# Patient Record
Sex: FEMALE | Race: BLACK OR AFRICAN AMERICAN | Employment: OTHER | URBAN - METROPOLITAN AREA
[De-identification: names, ages, dates, MRNs, and addresses within clinical notes are randomized per-mention and may not be internally consistent; named-entity substitution may affect disease eponyms.]

---

## 2017-01-27 ENCOUNTER — OFFICE VISIT (OUTPATIENT)
Dept: INTERNAL MEDICINE CLINIC | Age: 77
End: 2017-01-27

## 2017-01-27 VITALS
OXYGEN SATURATION: 100 % | TEMPERATURE: 98.7 F | HEIGHT: 62 IN | DIASTOLIC BLOOD PRESSURE: 73 MMHG | BODY MASS INDEX: 28.52 KG/M2 | WEIGHT: 155 LBS | HEART RATE: 66 BPM | SYSTOLIC BLOOD PRESSURE: 117 MMHG

## 2017-01-27 DIAGNOSIS — M81.0 OSTEOPOROSIS: ICD-10-CM

## 2017-01-27 DIAGNOSIS — I10 ESSENTIAL HYPERTENSION: Primary | ICD-10-CM

## 2017-01-27 DIAGNOSIS — C18.9 MALIGNANT NEOPLASM OF COLON, UNSPECIFIED PART OF COLON (HCC): ICD-10-CM

## 2017-01-27 DIAGNOSIS — E78.00 PURE HYPERCHOLESTEROLEMIA: ICD-10-CM

## 2017-01-27 RX ORDER — GLUCOSAMINE SULFATE 1500 MG
1000 POWDER IN PACKET (EA) ORAL DAILY
Qty: 30 CAP | Refills: 1
Start: 2017-01-27 | End: 2019-06-23

## 2017-01-27 NOTE — PROGRESS NOTES
Nicky Soto is a 67 y.o. female. HPI   F/u HTN HLD osteoporosis  Only c/o left knee OA pain -cortisone shot was not effective  Has some dizziness with standing, no recurrent syncope  Not taoking calcium and vit d but on fosamax  No cp or sob sxs  Did not get repeat colonoscopy last year--Dr Keira Drake for hx colon cancer 2007    Patient Active Problem List    Diagnosis Date Noted    Stenosis of both carotid arteries without infarction 11/18/2015    Encephalopathy acute 11/13/2015    Prediabetes 02/26/2015    Osteoporosis 08/27/2014    Heart murmur 12/27/2012    Abdominal pain, other specified site 03/19/2012    Syncope and collapse 03/19/2012    DDD (degenerative disc disease), lumbar 04/28/2011    Altered mental status 08/05/2010    Hypokalemia 08/05/2010    UTI (lower urinary tract infection) 08/05/2010    Hyperglycemia 08/05/2010    HTN (hypertension) 06/24/2010    Colon cancer ([facility] [address] 06/24/2010    Pure hypercholesterolemia 06/24/2010    Depression 06/24/2010    Mitral regurgitation 06/24/2010     Current Outpatient Prescriptions   Medication Sig Dispense Refill    cholecalciferol (VITAMIN D3) 1,000 unit cap Take 1 Cap by mouth daily. 30 Cap 1    pravastatin (PRAVACHOL) 10 mg tablet TAKE 1 TABLET BY MOUTH NIGHTLY 90 Tab 2    amLODIPine (NORVASC) 10 mg tablet TAKE 1 TABLET EVERY DAY 90 Tab 3    alendronate (FOSAMAX) 70 mg tablet Take 1 Tab by mouth every seven (7) days. 12 Tab 4    triamterene-hydrochlorothiazide (MAXZIDE) 37.5-25 mg per tablet TAKE 1 TABLET EVERY DAY (DISCONTINUE CHLORTHALIDONE AND LASIX AND CLONIDINE) 90 Tab 3    lisinopril (PRINIVIL, ZESTRIL) 10 mg tablet Take 1 Tab by mouth daily. 90 Tab 3    Dqauxsul-Ofag-Gtxjgo-Hyalur Ac 717-255-89-2 mg cap Take 1 Cap by mouth daily.  calcium 600 mg Cap Take 600 mg by mouth daily.        Allergies   Allergen Reactions    Ambien [Zolpidem] Other (comments)     AMS-hospitalized 2010      Lab Results  Component Value Date/Time   Hemoglobin A1c 5.5 03/20/2012 04:30 AM   Glucose 109 09/10/2016 04:15 PM   Glucose (POC) 116 09/19/2012 06:47 AM   Glucose (POC) 110 03/28/2012 11:50 AM   LDL, calculated 89 07/25/2016 08:36 AM   Creatinine (POC) 1.0 09/19/2012 06:47 AM   Creatinine 1.65 09/10/2016 04:15 PM      Lab Results  Component Value Date/Time   Cholesterol, total 168 07/25/2016 08:36 AM   HDL Cholesterol 56 07/25/2016 08:36 AM   LDL, calculated 89 07/25/2016 08:36 AM   Triglyceride 114 07/25/2016 08:36 AM   CHOL/HDL Ratio 3.2 03/20/2012 04:30 AM       Lab Results  Component Value Date/Time   GFR est AA 37 09/10/2016 04:15 PM   GFR est non-AA 30 09/10/2016 04:15 PM   Creatinine (POC) 1.0 09/19/2012 06:47 AM   Creatinine 1.65 09/10/2016 04:15 PM   BUN 20 09/10/2016 04:15 PM   BUN (POC) 18 09/19/2012 06:47 AM   Sodium (POC) 142 09/19/2012 06:47 AM   Sodium 140 09/10/2016 04:15 PM   Potassium 3.1 09/10/2016 04:15 PM   Potassium (POC) 2.7 09/19/2012 06:47 AM   Chloride (POC) 99 09/19/2012 06:47 AM   Chloride 107 09/10/2016 04:15 PM   CO2 24 09/10/2016 04:15 PM         ROS    Physical Exam   Constitutional: She appears well-developed and well-nourished. Appears stated age   Cardiovascular: Normal rate, regular rhythm and normal heart sounds. Exam reveals no gallop and no friction rub. No murmur heard. Pulmonary/Chest: Effort normal and breath sounds normal. No respiratory distress. She has no wheezes. Abdominal: Soft. Bowel sounds are normal.   Musculoskeletal: She exhibits no edema. Neurological: She is alert. Psychiatric: She has a normal mood and affect. Nursing note and vitals reviewed. ASSESSMENT and PLAN  Sadie was seen today for hypertension, cholesterol problem, dizziness and knee pain.     Diagnoses and all orders for this visit:    Essential hypertension  -     METABOLIC PANEL, COMPREHENSIVE   controlled    Pure hypercholesterolemia  -     METABOLIC PANEL, COMPREHENSIVE  - LIPID PANEL   Continue pravastatin    Malignant neoplasm of colon, unspecified part of colon ([facility] [address]  -     REFERRAL TO GASTROENTEROLOGY-due for surveillance colonoscopy-discussed    Osteoporosis   Continue fosamax, vit d , calcium   Repeat dexa next year 2018 for L spine osteoporosis  Other orders  -     cholecalciferol (VITAMIN D3) 1,000 unit cap; Take 1 Cap by mouth daily. Follow-up Disposition:  Return in about 6 months (around 7/27/2017) for htn hld osteoporosis.

## 2017-01-27 NOTE — MR AVS SNAPSHOT
Visit Information Date & Time Provider Department Dept. Phone Encounter #  
 1/27/2017  8:00 AM Scott Scherer, [address] [place] [address],[address] [phone] [id number] Follow-up Instructions Return in about 6 months (around 7/27/2017) for htn hld osteoporosis. Upcoming Health Maintenance Date Due DTaP/Tdap/Td series (1 - Tdap) 5/2/1960 GLAUCOMA SCREENING Q2Y 5/2/2004 MEDICARE YEARLY EXAM 8/25/2016 COLONOSCOPY 9/21/2016 Pneumococcal 65+ High/Highest Risk (2 of 2 - PPSV23) 3/24/2017 Allergies as of 1/27/2017  Review Complete On: 1/27/2017 By: Scott Scherer MD  
  
 Severity Noted Reaction Type Reactions Ambien [Zolpidem]  04/28/2011    Other (comments) AMS-hospitalized 2010 Current Immunizations  Reviewed on 3/20/2012 Name Date Influenza Vaccine Split  Deferred (Patient Refused) Pneumococcal Vaccine (Unspecified Type)  Deferred (Patient Refused) Not reviewed this visit You Were Diagnosed With   
  
 Codes Comments Essential hypertension    -  Primary ICD-10-CM: I10 
ICD-9-CM: 401.9 Pure hypercholesterolemia     ICD-10-CM: E78.00 ICD-9-CM: 272.0 Malignant neoplasm of colon, unspecified part of colon ([facility] [address]     ICD-10-CM: C18.9 ICD-9-CM: 153.9 Vitals BP Pulse Temp Height(growth percentile) Weight(growth percentile) SpO2  
 117/73 (BP 1 Location: Left arm, BP Patient Position: Sitting) 66 98.7 °F (37.1 °C) (Oral) 5' 2\" (1.575 m) 155 lb (70.3 kg) 100% BMI OB Status Smoking Status 28.35 kg/m2 Hysterectomy Never Smoker BMI and BSA Data Body Mass Index Body Surface Area  
 28.35 kg/m 2 1.75 m 2 Preferred Pharmacy Pharmacy Name Phone [address] [phone] Your Updated Medication List  
  
   
This list is accurate as of: 1/27/17  8:45 AM.  Always use your most recent med list.  
  
  
  
  
 alendronate 70 mg tablet Commonly known as:  FOSAMAX Take 1 Tab by mouth every seven (7) days. amLODIPine 10 mg tablet Commonly known as:  Nia Spruce TAKE 1 TABLET EVERY DAY  
  
 calcium 600 mg Cap Take 600 mg by mouth daily. cholecalciferol 1,000 unit Cap Commonly known as:  VITAMIN D3 Take 1 Cap by mouth daily. Nnciohfr-Qzex-Nbbryu-Hyalur Ac 345-386-69-2 mg Cap Take 1 Cap by mouth daily. lisinopril 10 mg tablet Commonly known as:  Marge Fernanda Take 1 Tab by mouth daily. pravastatin 10 mg tablet Commonly known as:  PRAVACHOL  
TAKE 1 TABLET BY MOUTH NIGHTLY  
  
 triamterene-hydroCHLOROthiazide 37.5-25 mg per tablet Commonly known as:  Prabhjot Beady TAKE 1 TABLET EVERY DAY (DISCONTINUE CHLORTHALIDONE AND LASIX AND CLONIDINE) We Performed the Following LIPID PANEL [64492 CPT(R)] METABOLIC PANEL, COMPREHENSIVE [07102 CPT(R)] REFERRAL TO GASTROENTEROLOGY [WVP24 Custom] Comments:  
 Please evaluate patient for colonoscopy Follow-up Instructions Return in about 6 months (around 7/27/2017) for htn hld osteoporosis. Referral Information Referral ID Referred By Referred To  
  
 [id number] [facility], [address]   
   305 [facility] [address] [place], [address] [facility] Visits Status Start Date End Date 1 New Request 1/27/17 1/27/18 If your referral has a status of pending review or denied, additional information will be sent to support the outcome of this decision. Introducing [facility] & HEALTH SERVICES! Dear Norah Listen: Thank you for requesting a Youcruit account. Our records indicate that you already have an active Youcruit account. You can access your account anytime at [email or web address] Did you know that you can access your hospital and ER discharge instructions at any time in Youcruit? You can also review all of your test results from your hospital stay or ER visit. Additional Information If you have questions, please visit the Frequently Asked Questions section of the Algorithmiat website at [email or web address] Remember, [employer] is NOT to be used for urgent needs. For medical emergencies, dial 911. Now available from your iPhone and Android! Please provide this summary of care documentation to your next provider. Your primary care clinician is listed as Avila KELLER. If you have any questions after today's visit, please call [phone].

## 2017-01-28 DIAGNOSIS — R94.4 ABNORMAL RENAL FUNCTION TEST: Primary | ICD-10-CM

## 2017-01-28 LAB
ALBUMIN SERPL-MCNC: 4 G/DL (ref 3.5–4.8)
ALBUMIN/GLOB SERPL: 1.4 {RATIO} (ref 1.1–2.5)
ALP SERPL-CCNC: 48 IU/L (ref 39–117)
ALT SERPL-CCNC: 12 IU/L (ref 0–32)
AST SERPL-CCNC: 18 IU/L (ref 0–40)
BILIRUB SERPL-MCNC: 0.4 MG/DL (ref 0–1.2)
BUN SERPL-MCNC: 25 MG/DL (ref 8–27)
BUN/CREAT SERPL: 19 (ref 11–26)
CALCIUM SERPL-MCNC: 9.4 MG/DL (ref 8.7–10.3)
CHLORIDE SERPL-SCNC: 103 MMOL/L (ref 96–106)
CHOLEST SERPL-MCNC: 160 MG/DL (ref 100–199)
CO2 SERPL-SCNC: 27 MMOL/L (ref 18–29)
CREAT SERPL-MCNC: 1.33 MG/DL (ref 0.57–1)
GLOBULIN SER CALC-MCNC: 2.8 G/DL (ref 1.5–4.5)
GLUCOSE SERPL-MCNC: 101 MG/DL (ref 65–99)
HDLC SERPL-MCNC: 52 MG/DL
LDLC SERPL CALC-MCNC: 86 MG/DL (ref 0–99)
POTASSIUM SERPL-SCNC: 3.8 MMOL/L (ref 3.5–5.2)
PROT SERPL-MCNC: 6.8 G/DL (ref 6–8.5)
SODIUM SERPL-SCNC: 145 MMOL/L (ref 134–144)
TRIGL SERPL-MCNC: 108 MG/DL (ref 0–149)
VLDLC SERPL CALC-MCNC: 22 MG/DL (ref 5–40)

## 2017-01-29 NOTE — PROGRESS NOTES
Tell pt her kidney function is mildly impaired possible from age and htn but also some dehydration and med side effect. Stop lisinopril for now, hydrate amd get repeat bmp next week-will order lab. Cholesterol levels are well controlled.

## 2017-02-02 ENCOUNTER — APPOINTMENT (OUTPATIENT)
Dept: INTERNAL MEDICINE CLINIC | Age: 77
End: 2017-02-02

## 2017-02-03 ENCOUNTER — DOCUMENTATION ONLY (OUTPATIENT)
Dept: INTERNAL MEDICINE CLINIC | Age: 77
End: 2017-02-03

## 2017-02-03 LAB
BUN SERPL-MCNC: 19 MG/DL (ref 8–27)
BUN/CREAT SERPL: 15 (ref 11–26)
CALCIUM SERPL-MCNC: 9 MG/DL (ref 8.7–10.3)
CHLORIDE SERPL-SCNC: 100 MMOL/L (ref 96–106)
CO2 SERPL-SCNC: 23 MMOL/L (ref 18–29)
CREAT SERPL-MCNC: 1.27 MG/DL (ref 0.57–1)
GLUCOSE SERPL-MCNC: 109 MG/DL (ref 65–99)
POTASSIUM SERPL-SCNC: 3.3 MMOL/L (ref 3.5–5.2)
SODIUM SERPL-SCNC: 140 MMOL/L (ref 134–144)

## 2017-02-03 RX ORDER — POTASSIUM CHLORIDE 750 MG/1
10 TABLET, EXTENDED RELEASE ORAL 2 TIMES DAILY
Qty: 10 TAB | Refills: 0 | Status: SHIPPED | OUTPATIENT
Start: 2017-02-03 | End: 2017-02-08

## 2017-02-03 NOTE — PROGRESS NOTES
Discussed labs. Creatinine improved but not back to baseline. Stay off lisinopril. Monitor bp and return to office if > 140/90  escribed kcl x 5d,. Stop fosamax d/t elevated creatinin  F/u as scheduled , sooner if bp is elevated.

## 2017-02-08 RX ORDER — AMLODIPINE BESYLATE 10 MG/1
10 TABLET ORAL DAILY
COMMUNITY
End: 2017-06-02

## 2017-02-10 ENCOUNTER — SURGERY (OUTPATIENT)
Age: 77
End: 2017-02-10

## 2017-02-10 ENCOUNTER — ANESTHESIA EVENT (OUTPATIENT)
Dept: ENDOSCOPY | Age: 77
End: 2017-02-10
Payer: MEDICARE

## 2017-02-10 ENCOUNTER — ANESTHESIA (OUTPATIENT)
Dept: ENDOSCOPY | Age: 77
End: 2017-02-10
Payer: MEDICARE

## 2017-02-10 ENCOUNTER — HOSPITAL ENCOUNTER (OUTPATIENT)
Age: 77
Setting detail: OUTPATIENT SURGERY
Discharge: HOME | End: 2017-02-10
Attending: INTERNAL MEDICINE | Admitting: INTERNAL MEDICINE
Payer: MEDICARE

## 2017-02-10 VITALS
BODY MASS INDEX: 27.99 KG/M2 | HEART RATE: 52 BPM | OXYGEN SATURATION: 100 % | DIASTOLIC BLOOD PRESSURE: 71 MMHG | RESPIRATION RATE: 15 BRPM | HEIGHT: 62 IN | WEIGHT: 152.13 LBS | SYSTOLIC BLOOD PRESSURE: 139 MMHG | TEMPERATURE: 97.8 F

## 2017-02-10 PROCEDURE — 74011250636 HC RX REV CODE- 250/636: Performed by: INTERNAL MEDICINE

## 2017-02-10 PROCEDURE — 74011250636 HC RX REV CODE- 250/636

## 2017-02-10 PROCEDURE — 77030009426 HC FCPS BIOP ENDOSC BSC -B: Performed by: INTERNAL MEDICINE

## 2017-02-10 PROCEDURE — 88305 TISSUE EXAM BY PATHOLOGIST: CPT | Performed by: INTERNAL MEDICINE

## 2017-02-10 PROCEDURE — 74011250637 HC RX REV CODE- 250/637: Performed by: INTERNAL MEDICINE

## 2017-02-10 PROCEDURE — 76040000019: Performed by: INTERNAL MEDICINE

## 2017-02-10 PROCEDURE — 76060000031 HC ANESTHESIA FIRST 0.5 HR: Performed by: INTERNAL MEDICINE

## 2017-02-10 RX ORDER — SODIUM CHLORIDE 9 MG/ML
50 INJECTION, SOLUTION INTRAVENOUS CONTINUOUS
Status: DISCONTINUED | OUTPATIENT
Start: 2017-02-10 | End: 2017-02-10 | Stop reason: HOSPADM

## 2017-02-10 RX ORDER — SODIUM CHLORIDE 0.9 % (FLUSH) 0.9 %
5-10 SYRINGE (ML) INJECTION EVERY 8 HOURS
Status: DISCONTINUED | OUTPATIENT
Start: 2017-02-10 | End: 2017-02-10 | Stop reason: HOSPADM

## 2017-02-10 RX ORDER — PROPOFOL 10 MG/ML
INJECTION, EMULSION INTRAVENOUS AS NEEDED
Status: DISCONTINUED | OUTPATIENT
Start: 2017-02-10 | End: 2017-02-10 | Stop reason: HOSPADM

## 2017-02-10 RX ORDER — MIDAZOLAM HYDROCHLORIDE 1 MG/ML
1-2 INJECTION, SOLUTION INTRAMUSCULAR; INTRAVENOUS
Status: CANCELLED | OUTPATIENT
Start: 2017-02-10 | End: 2017-02-10

## 2017-02-10 RX ORDER — SODIUM CHLORIDE 0.9 % (FLUSH) 0.9 %
5-10 SYRINGE (ML) INJECTION AS NEEDED
Status: DISCONTINUED | OUTPATIENT
Start: 2017-02-10 | End: 2017-02-10 | Stop reason: HOSPADM

## 2017-02-10 RX ORDER — DEXTROMETHORPHAN/PSEUDOEPHED 2.5-7.5/.8
1.2 DROPS ORAL
Status: DISCONTINUED | OUTPATIENT
Start: 2017-02-10 | End: 2017-02-10 | Stop reason: HOSPADM

## 2017-02-10 RX ADMIN — SODIUM CHLORIDE 50 ML/HR: 900 INJECTION, SOLUTION INTRAVENOUS at 07:24

## 2017-02-10 RX ADMIN — PROPOFOL 110 MG: 10 INJECTION, EMULSION INTRAVENOUS at 07:55

## 2017-02-10 RX ADMIN — SIMETHICONE 80 MG: 20 SUSPENSION/ DROPS ORAL at 07:49

## 2017-02-10 NOTE — ANESTHESIA POSTPROCEDURE EVALUATION
Post-Anesthesia Evaluation and Assessment    Patient: Edi Cole MRN: [id number]  SSN: [id number]    YOB: 1940  Age: 67 y.o. Sex: female       Cardiovascular Function/Vital Signs  Visit Vitals    /66    Pulse (!) 55    Temp 36.6 °C (97.8 °F)    Resp 22    Ht 5' 2\" (1.575 m)    Wt 69 kg (152 lb 2 oz)    SpO2 99%    Breastfeeding No    BMI 27.82 kg/m2       Patient is status post general anesthesia for Procedure(s):  COLONOSCOPY  ENDOSCOPIC POLYPECTOMY. Nausea/Vomiting: None    Postoperative hydration reviewed and adequate. Pain:  Pain Scale 1: Numeric (0 - 10) (02/10/17 0809)  Pain Intensity 1: 0 (02/10/17 0809)   Managed    Neurological Status: At baseline    Mental Status and Level of Consciousness: Arousable    Pulmonary Status:   O2 Device: Room air (02/10/17 0809)   Adequate oxygenation and airway patent    Complications related to anesthesia: None    Post-anesthesia assessment completed.  No concerns    Signed By: Xi Morfin MD     February 10, 2017

## 2017-02-10 NOTE — DISCHARGE INSTRUCTIONS
Edi Cole  [id number]  1940    COLON DISCHARGE INSTRUCTIONS  Discomfort:  Redness at IV site- apply warm compress to area; if redness or soreness persist- contact your physician  There may be a slight amount of blood passed from the rectum  Gaseous discomfort- walking, belching will help relieve any discomfort  You may not operate a vehicle for 12 hours  You may not engage in an occupation involving machinery or appliances for rest of today  You may not drink alcoholic beverages for at least 12 hours  Avoid making any critical decisions for at least 24 hour  DIET:   Regular diet    - however -  remember your colon is empty and a heavy meal will produce gas. ACTIVITY:  It is recommended that you spend the remainder of the day resting -  avoid any strenuous activity. CALL M.D. ANY SIGN OF:   Increasing pain, nausea, vomiting  Abdominal distension (swelling)  New increased bleeding (oral or rectal)  Fever (chills)    Follow-up Instructions:   Call Dr. Lambert Smaller results in 10 days  Telephone # [phone]  Brief Findings: one polyp removed        DISCHARGE SUMMARY from Nurse    The following personal items collected during your admission are returned to you:   Dental Appliance: Dental Appliances: Lowers, Uppers, With patient  Vision: Visual Aid: Glasses, With patient  Hearing Aid:    Jewelry:    Clothing:    Other Valuables:    Valuables sent to safe: [employer] Activation    Thank you for requesting access to Oneexchangestreet. Please follow the instructions below to securely access and download your online medical record. Oneexchangestreet allows you to send messages to your doctor, view your test results, renew your prescriptions, schedule appointments, and more. How Do I Sign Up? 1. In your internet browser, go to www."[email or web address]"  2. Click on the First Time User? Click Here link in the Sign In box. You will be redirect to the New Member Sign Up page.   3. Enter your Oneexchangestreet Access Code exactly as it appears below. You will not need to use this code after youve completed the sign-up process. If you do not sign up before the expiration date, you must request a new code. StartersFund Access Code: Activation code not generated  Current StartersFund Status: Active (This is the date your StartersFund access code will )    4. Enter the last four digits of your Social Security Number (xxxx) and Date of Birth (mm/dd/yyyy) as indicated and click Submit. You will be taken to the next sign-up page. 5. Create a Chronon Systemst ID. This will be your StartersFund login ID and cannot be changed, so think of one that is secure and easy to remember. 6. Create a StartersFund password. You can change your password at any time. 7. Enter your Password Reset Question and Answer. This can be used at a later time if you forget your password. 8. Enter your e-mail address. You will receive e-mail notification when new information is available in [address] 9. Click Sign Up. You can now view and download portions of your medical record. 10. Click the Download Summary menu link to download a portable copy of your medical information. Additional Information    If you have questions, please visit the Frequently Asked Questions section of the StartersFund website at [email or web address] Remember, StartersFund is NOT to be used for urgent needs. For medical emergencies, dial 911.

## 2017-02-10 NOTE — IP AVS SNAPSHOT
[address] [facility] [address]. 
[phone] Patient: Danelle Lipoma MRN: [id number] MUZ:[birth year] You are allergic to the following Allergen Reactions Ambien (Zolpidem) Other (comments) AMS-hospitalized 2010 Recent Documentation Height Weight Breastfeeding? BMI OB Status Smoking Status 1.575 m 69 kg No 27.82 kg/m2 Hysterectomy Never Smoker Emergency Contacts Name Discharge Info Relation Home Work Mobile Cesar Fischer CAREGIVER [3] Son [22] [phone] Alberto Aviles  Other Relative [6] [phone] About your hospitalization You were admitted on:  February 10, 2017 You last received care in the:  [facility] ENDOSCOPY You were discharged on:  February 10, 2017 Unit phone number:  [phone] Why you were hospitalized Your primary diagnosis was:  Not on File Providers Seen During Your Hospitalizations Provider Role Specialty Primary office phone Ellen Narayanan MD Attending Provider Gastroenterology [phone] Your Primary Care Physician (PCP) Primary Care Physician Office Phone Office Fax Rod Hook [phone] Follow-up Information None Current Discharge Medication List  
  
CONTINUE these medications which have NOT CHANGED Dose & Instructions Dispensing Information Comments Morning Noon Evening Bedtime  
 amLODIPine 10 mg tablet Commonly known as:  Tad Alfredo Your next dose is: Today, Tomorrow Other:  _________ Dose:  10 mg Take 10 mg by mouth daily. Refills:  0  
     
   
   
   
  
 calcium 600 mg Cap Your next dose is: Today, Tomorrow Other:  _________ Dose:  600 mg Take 600 mg by mouth daily. Refills:  0  
     
   
   
   
  
 cholecalciferol 1,000 unit Cap Commonly known as:  VITAMIN D3  
   
 Your next dose is: Today, Tomorrow Other:  _________ Dose:  1000 Units Take 1 Cap by mouth daily. Quantity:  30 Cap Refills:  1 Cohcugzn-Miws-Yzvmcg-Hyalur Ac 492-095-67-2 mg Cap Your next dose is: Today, Tomorrow Other:  _________ Dose:  1 Cap Take 1 Cap by mouth daily. Refills:  0  
     
   
   
   
  
 triamterene-hydroCHLOROthiazide 37.5-25 mg per tablet Commonly known as:  Mat Slice Your next dose is: Today, Tomorrow Other:  _________ TAKE 1 TABLET EVERY DAY (DISCONTINUE CHLORTHALIDONE AND LASIX AND CLONIDINE) Quantity:  90 Tab Refills:  3 Discharge Instructions Sadie Alfonso [id number] 
1940 COLON DISCHARGE INSTRUCTIONS Discomfort: 
Redness at IV site- apply warm compress to area; if redness or soreness persist- contact your physician There may be a slight amount of blood passed from the rectum Gaseous discomfort- walking, belching will help relieve any discomfort You may not operate a vehicle for 12 hours You may not engage in an occupation involving machinery or appliances for rest of today You may not drink alcoholic beverages for at least 12 hours Avoid making any critical decisions for at least 24 hour DIET: 
 Regular diet  however -  remember your colon is empty and a heavy meal will produce gas. ACTIVITY: It is recommended that you spend the remainder of the day resting -  avoid any strenuous activity. CALL M.D. ANY SIGN OF: Increasing pain, nausea, vomiting Abdominal distension (swelling) New increased bleeding (oral or rectal) Fever (chills) Follow-up Instructions: 
 Call Dr. Annalise Antoine Biopsy results in 10 days Telephone # [phone] Brief Findings: one polyp removed DISCHARGE SUMMARY from Nurse The following personal items collected during your admission are returned to you: Dental Appliance: Dental Appliances: Lowers, Uppers, With patient Vision: Visual Aid: Glasses, With patient Hearing Aid:   
Jewelry:   
Clothing:   
Other Valuables:   
Valuables sent to safe: [employer] Activation Thank you for requesting access to 5k Fans. Please follow the instructions below to securely access and download your online medical record. 5k Fans allows you to send messages to your doctor, view your test results, renew your prescriptions, schedule appointments, and more. How Do I Sign Up? 1. In your internet browser, go to [email or web address] 
2. Click on the First Time User? Click Here link in the Sign In box. You will be redirect to the New Member Sign Up page. 3. Enter your 5k Fans Access Code exactly as it appears below. You will not need to use this code after youve completed the sign-up process. If you do not sign up before the expiration date, you must request a new code. 5k Fans Access Code: Activation code not generated Current 5k Fans Status: Active (This is the date your 5k Fans access code will ) 4. Enter the last four digits of your Social Security Number (xxxx) and Date of Birth (mm/dd/yyyy) as indicated and click Submit. You will be taken to the next sign-up page. 5. Create a 5k Fans ID. This will be your 5k Fans login ID and cannot be changed, so think of one that is secure and easy to remember. 6. Create a 5k Fans password. You can change your password at any time. 7. Enter your Password Reset Question and Answer. This can be used at a later time if you forget your password. 8. Enter your e-mail address. You will receive e-mail notification when new information is available in [address] 9. Click Sign Up. You can now view and download portions of your medical record. 10. Click the Download Summary menu link to download a portable copy of your medical information. Additional Information If you have questions, please visit the Frequently Asked Questions section of the Attentio website at [email or web address] [email or web address] Remember, [email or web address] is NOT to be used for urgent needs. For medical emergencies, dial 911. Discharge Orders None [facility] SERVICES! Dear Michelle Rueda: Thank you for requesting a Attentio account. Our records indicate that you already have an active Attentio account. You can access your account anytime at [email or web address]. [email or web address]/[email or web address] Desk Did you know that you can access your hospital and ER discharge instructions at any time in Attentio? You can also review all of your test results from your hospital stay or ER visit. Additional Information If you have questions, please visit the Frequently Asked Questions section of the Attentio website at [email or web address] Remember, [email or web address] is NOT to be used for urgent needs. For medical emergencies, dial 911. Now available from your iPhone and Android! General Information Please provide this summary of care documentation to your next provider. Patient Signature:  ____________________________________________________________ Date:  ____________________________________________________________  
  
[employer] Provider Signature:  ____________________________________________________________ Date:  ____________________________________________________________

## 2017-02-10 NOTE — H&P
Gastroenterology History and Physical    Patient: [facility]    Physician: Karina Short MD    Vital Signs: Blood pressure 138/74, pulse 60, temperature 97.9 °F (36.6 °C), resp. rate 12, height 5' 2\" (1.575 m), weight 69 kg (152 lb 2 oz), SpO2 100 %, not currently breastfeeding. Allergies: Allergies   Allergen Reactions    Ambien [Zolpidem] Other (comments)     AMS-hospitalized 2010       Indication: hx colon cancer    History:  Past Medical History   Diagnosis Date    Cancer ([address]      colon    Hypertension     Osteoporosis       Past Surgical History   Procedure Laterality Date    Hx hysterectomy      Hx colectomy  2007    Pr colonoscopy flx dx w/collj spec when pfrmd  9/21/2011          Hx tonsillectomy      Hx hernia repair  9/19/12     incisional      Social History     Social History    Marital status: SINGLE     Spouse name: N/A    Number of children: N/A    Years of education: N/A     Social History Main Topics    Smoking status: Never Smoker    Smokeless tobacco: Never Used    Alcohol use No    Drug use: No    Sexual activity: No     Other Topics Concern    None     Social History Narrative      Family History   Problem Relation Age of Onset    Cancer Mother      Pancreas    Liver Disease Father     Hypertension Sister        Medications:   Prior to Admission medications    Medication Sig Start Date End Date Taking? Authorizing Provider   amLODIPine (NORVASC) 10 mg tablet Take 10 mg by mouth daily. Yes Historical Provider   cholecalciferol (VITAMIN D3) 1,000 unit cap Take 1 Cap by mouth daily. 1/27/17  Yes Amanda Smith MD   triamterene-hydrochlorothiazide (MAXZIDE) 37.5-25 mg per tablet TAKE 1 TABLET EVERY DAY (DISCONTINUE CHLORTHALIDONE AND LASIX AND CLONIDINE) 6/22/16  Yes Amanda Smith MD   Ijkcpacl-Yceg-Sddijk-Hyalur Ac 887-526-00-2 mg cap Take 1 Cap by mouth daily. Yes Historical Provider   calcium 600 mg Cap Take 600 mg by mouth daily.    Yes Historical Provider Physical Exam:   HEENT: Head: Normocephalic, no lesions, without obvious abnormality.    Heart: regular rate and rhythm, S1, S2 normal, no murmur, click, rub or gallop   Lungs: chest clear, no wheezing, rales, normal symmetric air entry, Heart exam - S1, S2 normal, no murmur, no gallop, rate regular   Abdominal: Bowel sounds are normal, liver is not enlarged, spleen is not enlarged     Signed:  Kaci Cannon MD 2/10/2017

## 2017-02-10 NOTE — ANESTHESIA PREPROCEDURE EVALUATION
Anesthetic History   No history of anesthetic complications            Review of Systems / Medical History  Patient summary reviewed, nursing notes reviewed and pertinent labs reviewed    Pulmonary  Within defined limits                 Neuro/Psych         Psychiatric history     Cardiovascular    Hypertension              Exercise tolerance: >4 METS  Comments: 9-2016 EKG: NSR     ECHO: 60-65% EF, very mild AS, mild TR, mod IL    Denies hrt prob or chest pain   GI/Hepatic/Renal               Comments: Hx of colon ca Endo/Other        Arthritis     Other Findings            Physical Exam    Airway  Mallampati: I  TM Distance: 4 - 6 cm  Neck ROM: normal range of motion        Cardiovascular    Rhythm: regular  Rate: normal    Murmur: Grade 3     Dental    Dentition: Full upper dentures and Full lower dentures     Pulmonary  Breath sounds clear to auscultation               Abdominal  GI exam deferred       Other Findings            Anesthetic Plan    ASA: 2  Anesthesia type: total IV anesthesia          Induction: Intravenous  Anesthetic plan and risks discussed with: Patient      Did NOT take BP med this am,   /74

## 2017-02-10 NOTE — PROGRESS NOTES
Silvia [facility]  1940  [id number]    Situation:  Verbal report received from: James Brennan RN  Procedure: Procedure(s) with comments:  COLONOSCOPY  ENDOSCOPIC POLYPECTOMY - polypectomy    Background:    Preoperative diagnosis: HX COLON CANCER  Postoperative diagnosis: Colon polyp, diverticulosis    :  Dr. Sameera Ann  Assistant(s): Endoscopy Technician-1: Angelina Lanes  Endoscopy RN-1: Rhianna Hart RN    Specimens:   ID Type Source Tests Collected by Time Destination   1 : pasth - Sigmoid colon polyp Preservative Sigmoid  Uday Lyons MD 2/10/2017 3652 Pathology     H. Pylori  no    Assessment:  Intra-procedure medications     Anesthesia gave intra-procedure sedation and medications, see anesthesia flow sheet yes    Intravenous fluids: NS@ KVO     Vital signs stable       Abdominal assessment: round and soft       Recommendation:  Discharge patient per MD order  .       Family or Friend  Mao Glatter  Permission to share finding with family or friend yes

## 2017-02-10 NOTE — PROCEDURES
Colonoscopy Operative Report  Julia Chadwick  1940  [id number]      Procedure Type:   Colonoscopy with polypectomy (hot biopsy)     Indications:     Personal history of colon cancer (screening only)    Pre-operative Diagnosis: see indication above    Post-operative Diagnosis:  See findings below    : Nikky Barbosa MD    Referring Provider: Marcus Diggs    Sedation:  MAC anesthesia Propofol    Pre-Procedural Exam:      Airway: clear, Malimpati 2   Heart: RRR, without gallops or rubs  Lungs: clear bilaterally without wheezes, crackles, or rhonchi  Abdomen: soft, nontender, nondistended, bowel sounds present     Procedure Details:  After informed consent was obtained with all risks and benefits of procedure explained and preoperative exam completed, the patient was taken to the endoscopy suite and placed in the left lateral decubitus position. Upon sequential sedation as per above, a digital rectal exam was performed. The Olympus videocolonoscope [id number] was inserted in the rectum and carefully advanced to the terminal ileum. The quality of preparation was good. The colonoscope was slowly withdrawn with careful evaluation between folds. Retoflexion in the rectum was completed. Findings/Impression: Rectum:   Normal  Sigmoid:     - Excavated lesions:     - Diverticulosis    - Protruding lesions:     -Sessile Polyp(s) size 2 mm removed by polypectomy (hot biopsy)  Descending Colon:   Normal  Transverse Colon:   Normal  Ascending Colon:     - Normal ileo-colonic anastamosis  Cecum:     - N/A            Specimen Removed:  Tiny polyp cauterized    Complications: None. EBL:  None. Recommendations: --Repeat colonoscopy in 5 years. Regular diet. Resume normal medication(s). You may be asked to call your doctor's office for the results. Discharge Disposition:  Home in the company of a  when able to ambulate.

## 2017-05-01 ENCOUNTER — OFFICE VISIT (OUTPATIENT)
Dept: INTERNAL MEDICINE CLINIC | Age: 77
End: 2017-05-01

## 2017-05-01 VITALS
TEMPERATURE: 98.5 F | DIASTOLIC BLOOD PRESSURE: 67 MMHG | BODY MASS INDEX: 29.19 KG/M2 | SYSTOLIC BLOOD PRESSURE: 123 MMHG | WEIGHT: 158.6 LBS | OXYGEN SATURATION: 98 % | HEART RATE: 74 BPM | HEIGHT: 62 IN

## 2017-05-01 DIAGNOSIS — R60.0 BILATERAL EDEMA OF LOWER EXTREMITY: Primary | ICD-10-CM

## 2017-05-01 DIAGNOSIS — N18.30 CKD (CHRONIC KIDNEY DISEASE) STAGE 3, GFR 30-59 ML/MIN (HCC): ICD-10-CM

## 2017-05-01 RX ORDER — FUROSEMIDE 20 MG/1
20 TABLET ORAL DAILY
Qty: 30 TAB | Refills: 1 | Status: SHIPPED | OUTPATIENT
Start: 2017-05-01 | End: 2017-05-01 | Stop reason: SDUPTHER

## 2017-05-01 RX ORDER — PRAVASTATIN SODIUM 10 MG/1
TABLET ORAL
COMMUNITY
End: 2017-11-01 | Stop reason: SDUPTHER

## 2017-05-01 RX ORDER — FUROSEMIDE 20 MG/1
TABLET ORAL
Qty: 90 TAB | Refills: 1 | Status: SHIPPED | OUTPATIENT
Start: 2017-05-01 | End: 2017-06-02 | Stop reason: SDUPTHER

## 2017-05-01 NOTE — PROGRESS NOTES
Karena Floyd is a 67 y.o. female. HPI     C/o pedal edema x weeks  Had 2 cortisone shots to left knee which did not help the knee pain. Pt declines TKR  Lisinopril was stopped 3 mos ago d/t elevated bun/cr. No nsaids  Has not been wearing support stockings at home   Denies any cp sob pnd orthopnea      Patient Active Problem List    Diagnosis Date Noted    Stenosis of both carotid arteries without infarction 11/18/2015    Encephalopathy acute 11/13/2015    Prediabetes 02/26/2015    Osteoporosis 08/27/2014    Heart murmur 12/27/2012    Abdominal pain, other specified site 03/19/2012    Syncope and collapse 03/19/2012    DDD (degenerative disc disease), lumbar 04/28/2011    Altered mental status 08/05/2010    Hypokalemia 08/05/2010    UTI (lower urinary tract infection) 08/05/2010    Hyperglycemia 08/05/2010    HTN (hypertension) 06/24/2010    Colon cancer ([facility] [address] 06/24/2010    Pure hypercholesterolemia 06/24/2010    Depression 06/24/2010    Mitral regurgitation 06/24/2010     Current Outpatient Prescriptions   Medication Sig Dispense Refill    pravastatin (PRAVACHOL) 10 mg tablet Take  by mouth nightly.  furosemide (LASIX) 20 mg tablet Take 1 Tab by mouth daily. 30 Tab 1    amLODIPine (NORVASC) 10 mg tablet Take 10 mg by mouth daily.  cholecalciferol (VITAMIN D3) 1,000 unit cap Take 1 Cap by mouth daily. 30 Cap 1    triamterene-hydrochlorothiazide (MAXZIDE) 37.5-25 mg per tablet TAKE 1 TABLET EVERY DAY (DISCONTINUE CHLORTHALIDONE AND LASIX AND CLONIDINE) 90 Tab 3    Lfghzwkz-Kbha-Smvjyi-Hyalur Ac 587-688-53-2 mg cap Take 1 Cap by mouth daily.  calcium 600 mg Cap Take 600 mg by mouth daily.        Allergies   Allergen Reactions    Ambien [Zolpidem] Other (comments)     AMS-hospitalized 2010      Lab Results  Component Value Date/Time   Hemoglobin A1c 5.5 03/20/2012 04:30 AM   Glucose 109 02/02/2017 11:48 AM   Glucose (POC) 116 09/19/2012 06:47 AM Glucose (POC) 110 03/28/2012 11:50 AM   LDL, calculated 86 01/27/2017 08:57 AM   Creatinine (POC) 1.0 09/19/2012 06:47 AM   Creatinine 1.27 02/02/2017 11:48 AM      Lab Results  Component Value Date/Time   GFR est AA 47 02/02/2017 11:48 AM   GFR est non-AA 41 02/02/2017 11:48 AM   Creatinine (POC) 1.0 09/19/2012 06:47 AM   Creatinine 1.27 02/02/2017 11:48 AM   BUN 19 02/02/2017 11:48 AM   BUN (POC) 18 09/19/2012 06:47 AM   Sodium (POC) 142 09/19/2012 06:47 AM   Sodium 140 02/02/2017 11:48 AM   Potassium 3.3 02/02/2017 11:48 AM   Potassium (POC) 2.7 09/19/2012 06:47 AM   Chloride (POC) 99 09/19/2012 06:47 AM   Chloride 100 02/02/2017 11:48 AM   CO2 23 02/02/2017 11:48 AM         ROS    Physical Exam   Constitutional: She appears well-developed and well-nourished. Appears stated age   Cardiovascular: Normal rate, regular rhythm and normal heart sounds. Exam reveals no gallop and no friction rub. No murmur heard. Pulmonary/Chest: Effort normal and breath sounds normal. No respiratory distress. She has no wheezes. Abdominal: Soft. Bowel sounds are normal.   Musculoskeletal: She exhibits edema. Left knee with warmth and effusion  1-2 plus BLE edema, pitting at ankles   Neurological: She is alert. Psychiatric: She has a normal mood and affect. Nursing note and vitals reviewed. ASSESSMENT and PLAN  Sadie was seen today for foot swelling and knee pain. Diagnoses and all orders for this visit:    Bilateral edema of lower extremity   Advised support stockings--rx given   Elevated   Lasix 20 mg every day x 3-5 d prn   Stop norvasc for now  CKD (chronic kidney disease) stage 3, GFR 30-59 ml/min  -     METABOLIC PANEL, BASIC    Other orders  -     furosemide (LASIX) 20 mg tablet; Take 1 Tab by mouth daily. Follow-up Disposition:  Return in about 1 month (around 6/1/2017) for htn leg edema.

## 2017-05-01 NOTE — MR AVS SNAPSHOT
Visit Information Date & Time Provider Department Dept. Phone Encounter #  
 5/1/2017  2:45 PM Silas Ambrose, [address] [phone] [id number] Follow-up Instructions Return in about 1 month (around 6/1/2017) for htn leg edema. Your Appointments 7/27/2017  8:30 AM  
ROUTINE CARE with Silas Ambrose, [address] [facility]) Appt Note: 6 month follow up  
 [facility] [address] [id number]  
[address] [facility] [address] [place] [address] Upcoming Health Maintenance Date Due DTaP/Tdap/Td series (1 - Tdap) 5/2/1960 GLAUCOMA SCREENING Q2Y 5/2/2004 MEDICARE YEARLY EXAM 8/25/2016 Pneumococcal 65+ High/Highest Risk (2 of 2 - PPSV23) 3/24/2017 INFLUENZA AGE 9 TO ADULT 8/1/2017 COLONOSCOPY 2/10/2022 Allergies as of 5/1/2017  Review Complete On: 5/1/2017 By: Du Ashby LPN Severity Noted Reaction Type Reactions Ambien [Zolpidem]  04/28/2011    Other (comments) AMS-hospitalized 2010 Current Immunizations  Reviewed on 3/20/2012 Name Date Influenza Vaccine Split  Deferred (Patient Refused) Pneumococcal Vaccine (Unspecified Type)  Deferred (Patient Refused) Not reviewed this visit You Were Diagnosed With   
  
 Codes Comments Bilateral edema of lower extremity    -  Primary ICD-10-CM: R60.0 ICD-9-CM: 782.3 CKD (chronic kidney disease) stage 3, GFR 30-59 ml/min     ICD-10-CM: N18.3 ICD-9-CM: 047. 3 Vitals BP Pulse Temp Height(growth percentile) Weight(growth percentile) SpO2  
 123/67 (BP 1 Location: Left arm, BP Patient Position: Sitting) 74 98.5 °F (36.9 °C) (Oral) 5' 2\" (1.575 m) 158 lb 9.6 oz (71.9 kg) 98% BMI OB Status Smoking Status 29.01 kg/m2 Hysterectomy Never Smoker BMI and BSA Data Body Mass Index Body Surface Area  
 29.01 kg/m 2 1.77 m 2 Preferred Pharmacy Pharmacy Name Phone [facility] [address] [address] Medical [facility] [phone] Your Updated Medication List  
  
   
This list is accurate as of: 5/1/17  3:18 PM.  Always use your most recent med list. amLODIPine 10 mg tablet Commonly known as:  Sharri Million Take 10 mg by mouth daily. calcium 600 mg Cap Take 600 mg by mouth daily. cholecalciferol 1,000 unit Cap Commonly known as:  VITAMIN D3 Take 1 Cap by mouth daily. furosemide 20 mg tablet Commonly known as:  LASIX Take 1 Tab by mouth daily. Eklhqzqm-Ydel-Gwlrul-Hyalur Ac 065-748-96-2 mg Cap Take 1 Cap by mouth daily. pravastatin 10 mg tablet Commonly known as:  PRAVACHOL Take  by mouth nightly. triamterene-hydroCHLOROthiazide 37.5-25 mg per tablet Commonly known as:  Chares Earing TAKE 1 TABLET EVERY DAY (DISCONTINUE CHLORTHALIDONE AND LASIX AND CLONIDINE) Prescriptions Sent to Pharmacy Refills  
 furosemide (LASIX) 20 mg tablet 1 Sig: Take 1 Tab by mouth daily. Class: Normal  
 Pharmacy: [facility] [address] [place] [address] Ph #: [phone] Route: Oral  
  
We Performed the Following METABOLIC PANEL, BASIC [98200 CPT(R)] Follow-up Instructions Return in about 1 month (around 6/1/2017) for htn leg edema. Introducing [facility] & HEALTH SERVICES! Dear Flaquito Ferrera: Thank you for requesting a My[employer]Work account. Our records indicate that you already have an active MyScienceWork account. You can access your account anytime at [email or web address] Did you know that you can access your hospital and ER discharge instructions at any time in MyScienceWork? You can also review all of your test results from your hospital stay or ER visit. Additional Information If you have questions, please visit the Frequently Asked Questions section of the [employer] website at [email or web address] Remember, [email or web address] is NOT to be used for urgent needs. For medical emergencies, dial 911. Now available from your iPhone and Android! Please provide this summary of care documentation to your next provider. Your primary care clinician is listed as Evelia KELLER. If you have any questions after today's visit, please call [phone].

## 2017-05-02 LAB
BUN SERPL-MCNC: 19 MG/DL (ref 8–27)
BUN/CREAT SERPL: 17 (ref 12–28)
CALCIUM SERPL-MCNC: 9.7 MG/DL (ref 8.7–10.3)
CHLORIDE SERPL-SCNC: 102 MMOL/L (ref 96–106)
CO2 SERPL-SCNC: 26 MMOL/L (ref 18–29)
CREAT SERPL-MCNC: 1.1 MG/DL (ref 0.57–1)
GLUCOSE SERPL-MCNC: 107 MG/DL (ref 65–99)
POTASSIUM SERPL-SCNC: 3.3 MMOL/L (ref 3.5–5.2)
SODIUM SERPL-SCNC: 145 MMOL/L (ref 134–144)

## 2017-05-02 RX ORDER — POTASSIUM CHLORIDE 20 MEQ/1
20 TABLET, EXTENDED RELEASE ORAL DAILY
Qty: 30 TAB | Refills: 1 | Status: SHIPPED | OUTPATIENT
Start: 2017-05-02 | End: 2017-05-02 | Stop reason: SDUPTHER

## 2017-05-02 RX ORDER — POTASSIUM CHLORIDE 20 MEQ/1
TABLET, EXTENDED RELEASE ORAL
Qty: 90 TAB | Refills: 1 | Status: SHIPPED | OUTPATIENT
Start: 2017-05-02 | End: 2017-05-15 | Stop reason: SDUPTHER

## 2017-05-11 ENCOUNTER — PATIENT MESSAGE (OUTPATIENT)
Dept: INTERNAL MEDICINE CLINIC | Age: 77
End: 2017-05-11

## 2017-05-15 RX ORDER — POTASSIUM CHLORIDE 20 MEQ/1
TABLET, EXTENDED RELEASE ORAL
Qty: 90 TAB | Refills: 1 | Status: SHIPPED | OUTPATIENT
Start: 2017-05-15 | End: 2017-06-02 | Stop reason: SDUPTHER

## 2017-05-15 NOTE — TELEPHONE ENCOUNTER
From: Ashley Akins  To: Vito Kaur MD  Sent: 5/11/2017 12:09 PM EDT  Subject: Non-Urgent Medical Question    unable to  med because of lack of money please send to [employer] they will ship free   potassium chloride is what you order

## 2017-06-02 ENCOUNTER — OFFICE VISIT (OUTPATIENT)
Dept: INTERNAL MEDICINE CLINIC | Age: 77
End: 2017-06-02

## 2017-06-02 VITALS
BODY MASS INDEX: 28.56 KG/M2 | RESPIRATION RATE: 18 BRPM | OXYGEN SATURATION: 100 % | HEIGHT: 62 IN | HEART RATE: 108 BPM | TEMPERATURE: 97.8 F | SYSTOLIC BLOOD PRESSURE: 136 MMHG | DIASTOLIC BLOOD PRESSURE: 72 MMHG | WEIGHT: 155.2 LBS

## 2017-06-02 DIAGNOSIS — M25.562 CHRONIC PAIN OF LEFT KNEE: Primary | ICD-10-CM

## 2017-06-02 DIAGNOSIS — Z00.00 ROUTINE GENERAL MEDICAL EXAMINATION AT A HEALTH CARE FACILITY: ICD-10-CM

## 2017-06-02 DIAGNOSIS — R60.0 BILATERAL EDEMA OF LOWER EXTREMITY: ICD-10-CM

## 2017-06-02 DIAGNOSIS — I10 ESSENTIAL HYPERTENSION: ICD-10-CM

## 2017-06-02 DIAGNOSIS — G89.29 CHRONIC PAIN OF LEFT KNEE: Primary | ICD-10-CM

## 2017-06-02 DIAGNOSIS — Z13.39 SCREENING FOR ALCOHOLISM: ICD-10-CM

## 2017-06-02 RX ORDER — TRAMADOL HYDROCHLORIDE 50 MG/1
50 TABLET ORAL
Qty: 30 TAB | Refills: 5 | Status: SHIPPED | OUTPATIENT
Start: 2017-06-02 | End: 2017-06-02 | Stop reason: SDUPTHER

## 2017-06-02 RX ORDER — TRAMADOL HYDROCHLORIDE 50 MG/1
50 TABLET ORAL
Qty: 30 TAB | Refills: 5 | Status: SHIPPED | OUTPATIENT
Start: 2017-06-02 | End: 2019-06-23

## 2017-06-02 RX ORDER — TRIAMTERENE/HYDROCHLOROTHIAZID 37.5-25 MG
TABLET ORAL
Qty: 90 TAB | Refills: 3 | Status: SHIPPED | OUTPATIENT
Start: 2017-06-02 | End: 2019-06-23

## 2017-06-02 RX ORDER — POTASSIUM CHLORIDE 20 MEQ/1
20 TABLET, EXTENDED RELEASE ORAL
Qty: 90 TAB | Refills: 1
Start: 2017-06-02 | End: 2019-06-23

## 2017-06-02 RX ORDER — FUROSEMIDE 20 MG/1
20 TABLET ORAL
Qty: 90 TAB | Refills: 1
Start: 2017-06-02 | End: 2019-06-23

## 2017-06-02 NOTE — PROGRESS NOTES
HISTORY OF PRESENT ILLNESS  Sadie Bey is a 65 y.o. female. HPI     F/u leg edema and htn  norvasc was stopped  Taking lasix and Kcl daily with improvement id edema per pt    Patient Active Problem List    Diagnosis Date Noted    Stenosis of both carotid arteries without infarction 11/18/2015    Encephalopathy acute 11/13/2015    Prediabetes 02/26/2015    Osteoporosis 08/27/2014    Heart murmur 12/27/2012    Abdominal pain, other specified site 03/19/2012    Syncope and collapse 03/19/2012    DDD (degenerative disc disease), lumbar 04/28/2011    Altered mental status 08/05/2010    Hypokalemia 08/05/2010    UTI (lower urinary tract infection) 08/05/2010    Hyperglycemia 08/05/2010    HTN (hypertension) 06/24/2010    [facility] 06/24/2010    Pure hypercholesterolemia 06/24/2010    Depression 06/24/2010    Mitral regurgitation 06/24/2010     Current Outpatient Prescriptions   Medication Sig Dispense Refill    triamterene-hydroCHLOROthiazide (MAXZIDE) 37.5-25 mg per tablet TAKE 1 TABLET EVERY DAY (DISCONTINUE CHLORTHALIDONE AND LASIX AND CLONIDINE) 90 Tab 3    potassium chloride (K-DUR, KLOR-CON) 20 mEq tablet Take 1 Tab by mouth daily as needed. TAKE 1 TABLET BY MOUTH DAILY 90 Tab 1    furosemide (LASIX) 20 mg tablet Take 1 Tab by mouth daily as needed. 90 Tab 1    traMADol (ULTRAM) 50 mg tablet Take 1 Tab by mouth every eight (8) hours as needed for Pain. Max Daily Amount: 150 mg. 30 Tab 5    pravastatin (PRAVACHOL) 10 mg tablet Take  by mouth nightly.  cholecalciferol (VITAMIN D3) 1,000 unit cap Take 1 Cap by mouth daily. 30 Cap 1    Kitnpylf-Rbam-Ekueud-Hyalur Ac 307-217-61-2 mg cap Take 1 Cap by mouth daily.  calcium 600 mg Cap Take 600 mg by mouth daily.        Allergies   Allergen Reactions    Ambien [Zolpidem] Other (comments)     AMS-hospitalized 2010      Lab Results  Component Value Date/Time   GFR est AA 56 05/01/2017 03:29 PM   GFR est non-AA 49 05/01/2017 03:29 PM   Creatinine (POC) 1.0 09/19/2012 06:47 AM   Creatinine 1.10 05/01/2017 03:29 PM   BUN 19 05/01/2017 03:29 PM   BUN (POC) 18 09/19/2012 06:47 AM   Sodium (POC) 142 09/19/2012 06:47 AM   Sodium 145 05/01/2017 03:29 PM   Potassium 3.3 05/01/2017 03:29 PM   Potassium (POC) 2.7 09/19/2012 06:47 AM   Chloride (POC) 99 09/19/2012 06:47 AM   Chloride 102 05/01/2017 03:29 PM   CO2 26 05/01/2017 03:29 PM         ROS    Physical Exam   Constitutional: She appears well-developed and well-nourished. Appears stated age   Cardiovascular: Normal rate, regular rhythm and normal heart sounds. Exam reveals no gallop and no friction rub. No murmur heard. Pulmonary/Chest: Effort normal and breath sounds normal. No respiratory distress. She has no wheezes. Abdominal: Soft. Bowel sounds are normal.   Musculoskeletal: She exhibits no edema. Neurological: She is alert. Psychiatric: She has a normal mood and affect. Nursing note and vitals reviewed. ASSESSMENT and PLAN  Sadie was seen today for follow-up. Diagnoses and all orders for this visit:    Chronic pain of left knee   Tramadol 50 mg hs prn 30 plus 5 refills   F/u ortho MD  Essential hypertension   controlled  Bilateral edema of lower extremity   Controlled, improved off CCB   Take lasix and kcl only prn edema  Routine general medical examination at a health care facility    Screening for alcoholism    Other orders  -     triamterene-hydroCHLOROthiazide (MAXZIDE) 37.5-25 mg per tablet; TAKE 1 TABLET EVERY DAY (DISCONTINUE CHLORTHALIDONE AND LASIX AND CLONIDINE)  -     potassium chloride (K-DUR, KLOR-CON) 20 mEq tablet; Take 1 Tab by mouth daily as needed. TAKE 1 TABLET BY MOUTH DAILY  -     furosemide (LASIX) 20 mg tablet; Take 1 Tab by mouth daily as needed. -     Discontinue: traMADol (ULTRAM) 50 mg tablet; Take 1 Tab by mouth every eight (8) hours as needed for Pain. Max Daily Amount: 150 mg.  -     traMADol (ULTRAM) 50 mg tablet;  Take 1 Tab by mouth every eight (8) hours as needed for Pain. Max Daily Amount: 150 mg. Follow-up Disposition:  Return in about 6 months (around 12/2/2017) for htn leg edema hld labs.

## 2017-06-02 NOTE — PROGRESS NOTES
Chief Complaint   Patient presents with    Follow-up     BILAT edema feet and ankles     Health Maintenance Due   Topic Date Due    DTaP/Tdap/Td series (1 - Tdap) 05/02/1960    GLAUCOMA SCREENING Q2Y  05/02/2004    MEDICARE YEARLY EXAM  08/25/2016    Pneumococcal 65+ High/Highest Risk (2 of 2 - PPSV23) 03/24/2017     Coordination of Care Questions    1. Have you been to the ER, urgent care clinic since your last visit? No       Hospitalized since your last visit? No    2. Have you seen or consulted any other health care providers outside of the [address] [place] [address] since your last visit? Include any pap smears or colon screening.  Dr. Gilbert Apo for knee injections

## 2017-06-02 NOTE — MR AVS SNAPSHOT
Visit Information Date & Time Provider Department Dept. Phone Encounter #  
 6/2/2017  9:15 AM Ladi Bennett, [address] [place] [address] [phone] [id number] Follow-up Instructions Return in about 6 months (around 12/2/2017) for htn leg edema hld labs. Upcoming Health Maintenance Date Due DTaP/Tdap/Td series (1 - Tdap) 5/2/1960 GLAUCOMA SCREENING Q2Y 5/2/2004 MEDICARE YEARLY EXAM 8/25/2016 INFLUENZA AGE 9 TO ADULT 8/1/2017 COLONOSCOPY 2/10/2022 Allergies as of 6/2/2017  Review Complete On: 6/2/2017 By: Nicky Burciaga Severity Noted Reaction Type Reactions Ambien [Zolpidem]  04/28/2011    Other (comments) AMS-hospitalized 2010 Current Immunizations  Reviewed on 3/20/2012 Name Date Influenza Vaccine Split  Deferred (Patient Refused) Pneumococcal Vaccine (Unspecified Type)  Deferred (Patient Refused) Not reviewed this visit You Were Diagnosed With   
  
 Codes Comments Chronic pain of left knee    -  Primary ICD-10-CM: M25.562, R57.02 ICD-9-CM: 719.46, 338.29 Essential hypertension     ICD-10-CM: I10 
ICD-9-CM: 401.9 Bilateral edema of lower extremity     ICD-10-CM: R60.0 ICD-9-CM: 782.3 Routine general medical examination at a health care facility     ICD-10-CM: Z00.00 ICD-9-CM: V70.0 Screening for alcoholism     ICD-10-CM: Z13.89 ICD-9-CM: V79.1 Vitals BP Pulse Temp Resp Height(growth percentile) Weight(growth percentile) 136/72 (BP 1 Location: Left arm, BP Patient Position: Sitting) (!) 108 97.8 °F (36.6 °C) (Oral) 18 5' 2\" (1.575 m) 155 lb 3.2 oz (70.4 kg) SpO2 BMI OB Status Smoking Status 100% 28.39 kg/m2 Hysterectomy Never Smoker BMI and BSA Data Body Mass Index Body Surface Area  
 28.39 kg/m 2 1.75 m 2 Preferred Pharmacy Pharmacy Name Phone [address] [facility] [address] [phone] Your Updated Medication List  
  
   
This list is accurate as of: 6/2/17  9:46 AM.  Always use your most recent med list. amLODIPine 10 mg tablet Commonly known as:  Orin Yuni Take 10 mg by mouth daily. calcium 600 mg Cap Take 600 mg by mouth daily. cholecalciferol 1,000 unit Cap Commonly known as:  VITAMIN D3 Take 1 Cap by mouth daily. furosemide 20 mg tablet Commonly known as:  LASIX Take 1 Tab by mouth daily as needed. Giajlosh-Cjxn-Henemv-Hyalur Ac 759-959-98-2 mg Cap Take 1 Cap by mouth daily. potassium chloride 20 mEq tablet Commonly known as:  K-DUR, KLOR-CON Take 1 Tab by mouth daily as needed. TAKE 1 TABLET BY MOUTH DAILY pravastatin 10 mg tablet Commonly known as:  PRAVACHOL Take  by mouth nightly. traMADol 50 mg tablet Commonly known as:  ULTRAM  
Take 1 Tab by mouth every eight (8) hours as needed for Pain. Max Daily Amount: 150 mg.  
  
 triamterene-hydroCHLOROthiazide 37.5-25 mg per tablet Commonly known as:  Johnny Linen TAKE 1 TABLET EVERY DAY (DISCONTINUE CHLORTHALIDONE AND LASIX AND CLONIDINE) Prescriptions Printed Refills  
 traMADol (ULTRAM) 50 mg tablet 5 Sig: Take 1 Tab by mouth every eight (8) hours as needed for Pain. Max Daily Amount: 150 mg.  
 Class: Print Route: Oral  
  
Prescriptions Sent to Pharmacy Refills  
 triamterene-hydroCHLOROthiazide (MAXZIDE) 37.5-25 mg per tablet 3 Sig: TAKE 1 TABLET EVERY DAY (DISCONTINUE CHLORTHALIDONE AND LASIX AND CLONIDINE) Class: Normal  
 Pharmacy: [address] [place] [address], [address] [place] [address] Ph #: [phone] Follow-up Instructions Return in about 6 months (around 12/2/2017) for htn leg edema hld labs. Patient Instructions Schedule of Personalized Health Plan - female (Provide Copy to Patient) The best way to stay healthy is to live a healthy lifestyle.  A healthy lifestyle includes regular exercise, eating a well-balanced diet, keeping a healthy weight and not smoking. Regular physical exams and screening tests are another important way to take care of yourself. Preventive exams provided by health care providers can find health problems early when treatment works best and can keep you from getting certain diseases or illnesses. Preventive services include exams, lab tests, screenings, shots, monitoring and information to help you take care of your own health. All people over 65 should have a pneumonia shot. Pneumonia shots are usually only needed once in a lifetime unless your doctor decides differently. All people over 65 should have a yearly flu shot. People over 65 are at medium to high risk for Hepatitis B. Three shots are needed for complete protection. In addition to your physical exam, some screening tests are recommended: 
 
Bone mass measurement (dexa scan) is recommended every two years Diabetes Mellitus screening is recommended every year. Glaucoma is an eye disease caused by high pressure in the eye. An eye exam is recommended every year. Cardiovascular screening tests that check your cholesterol and other blood fat (lipid) levels are recommended every five years. Colorectal Cancer screening tests help to find pre-cancerous polyps (growths in the colon) so they can be removed before they turn into cancer. Tests ordered for screening depend on your personal and family history risk factors. Screening for Breast Cancer is recommended yearly with a mammogram. 
 
Screening for Cervical Cancer is recommended every two years (annually for certain risk factors, such as previous history of STD or abnormal PAP in past 7 years), with a Pelvic Exam with PAP Here is a list of your current Health Maintenance items with a due date: 
Health Maintenance Due Topic Date Due  
 DTaP/Tdap/Td series (1 - Tdap) 05/02/1960  GLAUCOMA SCREENING Q2Y  05/02/2004  MEDICARE YEARLY EXAM  08/25/2016 Introducing [facility] & HEALTH SERVICES! Dear Gardenia Meyers: Thank you for requesting a [employer] account. Our records indicate that you already have an active Datumate account. You can access your account anytime at [email or web address] Did you know that you can access your hospital and ER discharge instructions at any time in Datumate? You can also review all of your test results from your hospital stay or ER visit. Additional Information If you have questions, please visit the Frequently Asked Questions section of the Dat[employer] website at [email or web address] Remember, Datumate is NOT to be used for urgent needs. For medical emergencies, dial 911. Now available from your iPhone and Android! Please provide this summary of care documentation to your next provider. Your primary care clinician is listed as Olena Brunner LEE. If you have any questions after today's visit, please call [phone].

## 2017-06-02 NOTE — PATIENT INSTRUCTIONS
Schedule of Personalized Health Plan - female  (Provide Copy to Patient)  The best way to stay healthy is to live a healthy lifestyle. A healthy lifestyle includes regular exercise, eating a well-balanced diet, keeping a healthy weight and not smoking. Regular physical exams and screening tests are another important way to take care of yourself. Preventive exams provided by health care providers can find health problems early when treatment works best and can keep you from getting certain diseases or illnesses. Preventive services include exams, lab tests, screenings, shots, monitoring and information to help you take care of your own health. All people over 65 should have a pneumonia shot. Pneumonia shots are usually only needed once in a lifetime unless your doctor decides differently. All people over 65 should have a yearly flu shot. People over 65 are at medium to high risk for Hepatitis B. Three shots are needed for complete protection. In addition to your physical exam, some screening tests are recommended:    Bone mass measurement (dexa scan) is recommended every two years  Diabetes Mellitus screening is recommended every year. Glaucoma is an eye disease caused by high pressure in the eye. An eye exam is recommended every year. Cardiovascular screening tests that check your cholesterol and other blood fat (lipid) levels are recommended every five years. Colorectal Cancer screening tests help to find pre-cancerous polyps (growths in the colon) so they can be removed before they turn into cancer. Tests ordered for screening depend on your personal and family history risk factors.     Screening for Breast Cancer is recommended yearly with a mammogram.    Screening for Cervical Cancer is recommended every two years (annually for certain risk factors, such as previous history of STD or abnormal PAP in past 7 years), with a Pelvic Exam with PAP    Here is a list of your current Health Maintenance items with a due date:  Health Maintenance Due   Topic Date Due    DTaP/Tdap/Td series (1 - Tdap) 05/02/1960    GLAUCOMA SCREENING Q2Y  05/02/2004    MEDICARE YEARLY EXAM  08/25/2016

## 2017-06-02 NOTE — PROGRESS NOTES
This is a Subsequent Medicare Annual Wellness Visit providing Personalized Prevention Plan Services (PPPS) (Performed 12 months after initial AWV and PPPS )    I have reviewed the patient's medical history in detail and updated the computerized patient record. History     Past Medical History:   Diagnosis Date    Cancer ([address]     colon    Hypertension     Osteoporosis       Past Surgical History:   Procedure Laterality Date    COLONOSCOPY N/A 2/10/2017    COLONOSCOPY performed by Rupert Swanson MD at [address] [facility]/CAUTERY  2/10/2017         HX COLECTOMY  2007    HX HERNIA REPAIR  9/19/12    incisional    HX HYSTERECTOMY      HX TONSILLECTOMY      NH COLONOSCOPY FLX DX W/COLLJ SPEC WHEN PFRMD  9/21/2011          Current Outpatient Prescriptions   Medication Sig Dispense Refill    triamterene-hydroCHLOROthiazide (MAXZIDE) 37.5-25 mg per tablet TAKE 1 TABLET EVERY DAY (DISCONTINUE CHLORTHALIDONE AND LASIX AND CLONIDINE) 90 Tab 3    potassium chloride (K-DUR, KLOR-CON) 20 mEq tablet Take 1 Tab by mouth daily as needed. TAKE 1 TABLET BY MOUTH DAILY 90 Tab 1    furosemide (LASIX) 20 mg tablet Take 1 Tab by mouth daily as needed. 90 Tab 1    traMADol (ULTRAM) 50 mg tablet Take 1 Tab by mouth every eight (8) hours as needed for Pain. Max Daily Amount: 150 mg. 30 Tab 5    pravastatin (PRAVACHOL) 10 mg tablet Take  by mouth nightly.  amLODIPine (NORVASC) 10 mg tablet Take 10 mg by mouth daily.  cholecalciferol (VITAMIN D3) 1,000 unit cap Take 1 Cap by mouth daily. 30 Cap 1    Mbuoefwx-Wniv-Lfvzxw-Hyalur Ac 152-321-14-2 mg cap Take 1 Cap by mouth daily.  calcium 600 mg Cap Take 600 mg by mouth daily.        Allergies   Allergen Reactions    Ambien [Zolpidem] Other (comments)     [facility]-hospitalized 2010     Family History   Problem Relation Age of Onset    Cancer Mother      Pancreas    Liver Disease Father     Hypertension Sister      Social History Substance Use Topics    Smoking status: Never Smoker    Smokeless tobacco: Never Used    Alcohol use No     Patient Active Problem List   Diagnosis Code    HTN (hypertension) I10    Colon cancer [address] [address] C18.9    Pure hypercholesterolemia E78.00    Depression F32.9    Mitral regurgitation I34.0    Altered mental status R41.82    Hypokalemia E87.6    UTI (lower urinary tract infection) N39.0    Hyperglycemia R73.9    DDD (degenerative disc disease), lumbar M51.36    Abdominal pain, other specified site R10.9    Syncope and collapse R55    Heart murmur R01.1    Osteoporosis M81.0    Prediabetes R73.03    Encephalopathy acute G93.40    Stenosis of both carotid arteries without infarction I65.23       Depression Risk Factor Screening:     PHQ over the last two weeks 7/25/2016   Little interest or pleasure in doing things Not at all   Feeling down, depressed or hopeless Not at all   Total Score PHQ 2 0     Alcohol Risk Factor Screening: On any occasion during the past 3 months, have you had more than 3 drinks containing alcohol? No    Do you average more than 7 drinks per week? No      Functional Ability and Level of Safety:     Hearing Loss   none    Activities of Daily Living   Self-care. Requires assistance with: no ADLs    Fall Risk     Fall Risk Assessment, last 12 mths 6/2/2017   Able to walk? Yes   Fall in past 12 months? No   Fall with injury? -     Abuse Screen   Patient is not abused    Review of Systems   A comprehensive review of systems was negative except for that written in the HPI.     Physical Examination     Evaluation of Cognitive Function:  Mood/affect:  happy  Appearance: age appropriate, within normal Limits and younger than stated age  Family member/caregiver input:     Visit Vitals    /72 (BP 1 Location: Left arm, BP Patient Position: Sitting)    Pulse (!) 108    Temp 97.8 °F (36.6 °C) (Oral)    Resp 18    Ht 5' 2\" (1.575 m)    Wt 155 lb 3.2 oz (70.4 kg)    SpO2 100%    BMI 28.39 kg/m2     General:  Alert, cooperative, no distress, appears stated age. Head:  Normocephalic, without obvious abnormality, atraumatic. Eyes:  Conjunctivae/corneas clear. PERRL, EOMs intact. Fundi benign. Ears:  Normal TMs and external ear canals both ears. Nose: Nares normal. Septum midline. Mucosa normal. No drainage or sinus tenderness. Throat: Lips, mucosa, and tongue normal. Teeth and gums normal.   Neck: Supple, symmetrical, trachea midline, no adenopathy, thyroid: no enlargement/tenderness/nodules, no carotid bruit and no JVD. Back:   Symmetric, no curvature. ROM normal. No CVA tenderness. Lungs:   Clear to auscultation bilaterally. Chest wall:  No tenderness or deformity. Heart:  Regular rate and rhythm, S1, S2 normal, no murmur, click, rub or gallop. Breast Exam:  No tenderness, masses, or nipple abnormality. Abdomen:   Soft, non-tender. Bowel sounds normal. No masses,  No organomegaly. Genitalia:  Normal female without lesion, discharge or tenderness. Rectal:  Normal tone,  no masses or tenderness  Guaiac negative stool. Extremities: Extremities normal, atraumatic, no cyanosis or edema. Pulses: 2+ and symmetric all extremities. Skin: Skin color, texture, turgor normal. No rashes or lesions. Lymph nodes: Cervical, supraclavicular, and axillary nodes normal.   Neurologic: CNII-XII intact. Normal strength, sensation and reflexes throughout.        Patient Care Team:  Irene Hua MD as PCP - General  [facility], RN as Nurse Navigator  Erica Fuentes MD (Hematology and Oncology)  Rose Forrester MD (Colon and Rectal Surgery)  Robin Zuluaga MD (Gastroenterology)  Kami Pérez MD (General Surgery)  [place] [id number] [facility] [place] [facility])  Sabi Quiroga RN as [facility]  Jony Estes MD (Orthopedic Surgery)    Advice/Referrals/Counseling   Education and counseling provided:  Are appropriate based on today's review and evaluation  End-of-Life planning (with patient's consent)-see acp note  Pneumococcal Vaccine-pt declines  Screening for glaucoma-UTD per pt      Assessment/Plan   Sadie was seen today for follow-up. Diagnoses and all orders for this visit:    Chronic pain of left knee   D/t OA   Tramadol hs prn 30 plus 5 refills   Cortisone shot not ngleft  Essential hypertension   bp ok off norvasc  Bilateral edema of lower extremity   Improved, lasix prn only    Other orders  -     triamterene-hydroCHLOROthiazide (MAXZIDE) 37.5-25 mg per tablet; TAKE 1 TABLET EVERY DAY (DISCONTINUE CHLORTHALIDONE AND LASIX AND CLONIDINE)  -     potassium chloride (K-DUR, KLOR-CON) 20 mEq tablet; Take 1 Tab by mouth daily as needed. TAKE 1 TABLET BY MOUTH DAILY  -     furosemide (LASIX) 20 mg tablet; Take 1 Tab by mouth daily as needed. -     traMADol (ULTRAM) 50 mg tablet; Take 1 Tab by mouth every eight (8) hours as needed for Pain. Max Daily Amount: 150 mg.    rtc 6 months  Follow-up Disposition: Not on File.

## 2017-11-02 RX ORDER — PRAVASTATIN SODIUM 10 MG/1
TABLET ORAL
Qty: 90 TAB | Refills: 2 | Status: SHIPPED | OUTPATIENT
Start: 2017-11-02 | End: 2019-06-23

## 2017-12-02 PROBLEM — M17.12 PRIMARY OSTEOARTHRITIS OF LEFT KNEE: Status: ACTIVE | Noted: 2017-12-02

## 2017-12-02 PROBLEM — C80.1 CANCER (HCC): Status: ACTIVE | Noted: 2017-12-02

## 2017-12-02 PROBLEM — N18.30 CKD (CHRONIC KIDNEY DISEASE) STAGE 3, GFR 30-59 ML/MIN (HCC): Status: ACTIVE | Noted: 2017-12-02

## 2017-12-03 NOTE — PROGRESS NOTES
HISTORY OF PRESENT ILLNESS  Sadie Ortiz is a 65 y.o. female. HPI      F/u HTN HLD prediabetes osteoporosis and mild carotid stenoisis and leg edema,ckd 3  Hx colon cancer stage 3--polyp was removed this year--Dr. Samy Martin was stopped d/t leg edema  Taking lasix and Kcl daily with improvement in edema per pt  Last carotid dopplers 2015--mild stable b/l stenosis  Had DEXA scan last year-osteopenia of hip and osteoporosis of spine T-2.6-----  Takes tramadol for left knee pain as needed-----DJD of knee. ckd 3  Patient Active Problem List    Diagnosis Date Noted    Stenosis of both carotid arteries without infarction 11/18/2015    Encephalopathy acute 11/13/2015    Prediabetes 02/26/2015    Osteoporosis 08/27/2014    Heart murmur 12/27/2012    Abdominal pain, other specified site 03/19/2012    Syncope and collapse 03/19/2012    DDD (degenerative disc disease), lumbar 04/28/2011    Altered mental status 08/05/2010    Hypokalemia 08/05/2010    UTI (lower urinary tract infection) 08/05/2010    Hyperglycemia 08/05/2010    HTN (hypertension) 06/24/2010    Colon cancer [address] [address] 06/24/2010    Pure hypercholesterolemia 06/24/2010    Depression 06/24/2010    Mitral regurgitation 06/24/2010     Current Outpatient Prescriptions   Medication Sig Dispense Refill    pravastatin (PRAVACHOL) 10 mg tablet TAKE 1 TABLET EVERY NIGHT 90 Tab 2    furosemide (LASIX) 20 mg tablet TAKE 1 TABLET BY MOUTH DAILY 90 Tab 3    triamterene-hydroCHLOROthiazide (MAXZIDE) 37.5-25 mg per tablet TAKE 1 TABLET EVERY DAY (DISCONTINUE CHLORTHALIDONE AND LASIX AND CLONIDINE) 90 Tab 3    potassium chloride (K-DUR, KLOR-CON) 20 mEq tablet Take 1 Tab by mouth daily as needed. TAKE 1 TABLET BY MOUTH DAILY 90 Tab 1    furosemide (LASIX) 20 mg tablet Take 1 Tab by mouth daily as needed. 90 Tab 1    traMADol (ULTRAM) 50 mg tablet Take 1 Tab by mouth every eight (8) hours as needed for Pain.  Max Daily Amount: 150 mg. 30 Tab 5    cholecalciferol (VITAMIN D3) 1,000 unit cap Take 1 Cap by mouth daily. 30 Cap 1    Rlbtgpoc-Ojrh-Cplwtj-Hyalur Ac 647-813-92-2 mg cap Take 1 Cap by mouth daily.  calcium 600 mg Cap Take 600 mg by mouth daily. Allergies   Allergen Reactions    Ambien [Zolpidem] Other (comments)     AMS-hospitalized 2010     Social History   Substance Use Topics    Smoking status: Never Smoker    Smokeless tobacco: Never Used    Alcohol use No      Lab Results  Component Value Date/Time   WBC 6.9 09/10/2016 04:15 PM   HGB 11.7 09/10/2016 04:15 PM   Hemoglobin (POC) 13.6 09/19/2012 06:47 AM   HCT 33.3 09/10/2016 04:15 PM   Hematocrit (POC) 40 09/19/2012 06:47 AM   PLATELET 708 84/24/1217 04:15 PM   MCV 86.5 09/10/2016 04:15 PM     Lab Results  Component Value Date/Time   Cholesterol, total 160 01/27/2017 08:57 AM   HDL Cholesterol 52 01/27/2017 08:57 AM   LDL, calculated 86 01/27/2017 08:57 AM   Triglyceride 108 01/27/2017 08:57 AM   CHOL/HDL Ratio 3.2 03/20/2012 04:30 AM     Lab Results  Component Value Date/Time   GFR est non-AA 49 05/01/2017 03:29 PM   GFRNA, POC 58 09/19/2012 06:47 AM   GFR est AA 56 05/01/2017 03:29 PM   GFRAA, POC >60 09/19/2012 06:47 AM   Creatinine 1.10 05/01/2017 03:29 PM   Creatinine (POC) 1.0 09/19/2012 06:47 AM   BUN 19 05/01/2017 03:29 PM   BUN (POC) 18 09/19/2012 06:47 AM   Sodium 145 05/01/2017 03:29 PM   Sodium (POC) 142 09/19/2012 06:47 AM   Potassium 3.3 05/01/2017 03:29 PM   Potassium (POC) 2.7 09/19/2012 06:47 AM   Chloride 102 05/01/2017 03:29 PM   Chloride (POC) 99 09/19/2012 06:47 AM   CO2 26 05/01/2017 03:29 PM   Magnesium 1.8 11/15/2015 03:03 AM          ROS    Physical Exam   Constitutional: She appears well-developed and well-nourished. Appears stated age   Cardiovascular: Normal rate, regular rhythm and normal heart sounds. Exam reveals no gallop and no friction rub. No murmur heard. Pulmonary/Chest: Effort normal and breath sounds normal. No respiratory distress.  She has no wheezes. Abdominal: Soft. Bowel sounds are normal.   Musculoskeletal: She exhibits no edema. Neurological: She is alert. Psychiatric: She has a normal mood and affect. Nursing note and vitals reviewed.       ASSESSMENT and PLAN  {ASSESSMENT/PLAN:[id number]}

## 2017-12-04 ENCOUNTER — TELEPHONE (OUTPATIENT)
Dept: INTERNAL MEDICINE CLINIC | Age: 77
End: 2017-12-04

## 2017-12-04 ENCOUNTER — OFFICE VISIT (OUTPATIENT)
Dept: INTERNAL MEDICINE CLINIC | Age: 77
End: 2017-12-04

## 2017-12-04 DIAGNOSIS — C18.9 MALIGNANT NEOPLASM OF COLON, UNSPECIFIED PART OF COLON (HCC): ICD-10-CM

## 2017-12-04 DIAGNOSIS — G89.29 CHRONIC PAIN OF LEFT KNEE: ICD-10-CM

## 2017-12-04 DIAGNOSIS — I10 ESSENTIAL HYPERTENSION: ICD-10-CM

## 2017-12-04 DIAGNOSIS — R73.9 HYPERGLYCEMIA: ICD-10-CM

## 2017-12-04 DIAGNOSIS — Z79.891 ENCOUNTER FOR LONG-TERM OPIATE ANALGESIC USE: ICD-10-CM

## 2017-12-04 DIAGNOSIS — M25.562 CHRONIC PAIN OF LEFT KNEE: ICD-10-CM

## 2017-12-04 DIAGNOSIS — Z12.39 BREAST SCREENING: ICD-10-CM

## 2017-12-04 DIAGNOSIS — N18.30 STAGE 3 CHRONIC KIDNEY DISEASE (HCC): ICD-10-CM

## 2017-12-04 DIAGNOSIS — M81.0 OSTEOPOROSIS, UNSPECIFIED OSTEOPOROSIS TYPE, UNSPECIFIED PATHOLOGICAL FRACTURE PRESENCE: ICD-10-CM

## 2017-12-04 DIAGNOSIS — I65.23 STENOSIS OF BOTH CAROTID ARTERIES WITHOUT INFARCTION: ICD-10-CM

## 2017-12-04 DIAGNOSIS — C80.1 CANCER (HCC): ICD-10-CM

## 2017-12-04 DIAGNOSIS — E78.00 PURE HYPERCHOLESTEROLEMIA: ICD-10-CM

## 2017-12-04 NOTE — TELEPHONE ENCOUNTER
Spoke with patients son and advised that she did not come to her appt today, he states he will go by to check on her

## 2017-12-04 NOTE — TELEPHONE ENCOUNTER
Pt's son, Leticia White, calling to find out if mom made it to her appt on today 12/04/17 at 9:15am. Best contact phone number is [phone].        Message received & copied from [facility]

## 2019-06-23 ENCOUNTER — HOSPITAL ENCOUNTER (EMERGENCY)
Age: 79
Discharge: HOME | End: 2019-06-23
Attending: EMERGENCY MEDICINE
Payer: MEDICARE

## 2019-06-23 VITALS
BODY MASS INDEX: 27.91 KG/M2 | OXYGEN SATURATION: 96 % | DIASTOLIC BLOOD PRESSURE: 69 MMHG | RESPIRATION RATE: 16 BRPM | HEIGHT: 62 IN | WEIGHT: 151.68 LBS | SYSTOLIC BLOOD PRESSURE: 119 MMHG | HEART RATE: 72 BPM | TEMPERATURE: 98.3 F

## 2019-06-23 DIAGNOSIS — L30.9 DERMATITIS: ICD-10-CM

## 2019-06-23 DIAGNOSIS — L50.9 LOCALIZED HIVES: ICD-10-CM

## 2019-06-23 DIAGNOSIS — I10 ESSENTIAL HYPERTENSION: ICD-10-CM

## 2019-06-23 DIAGNOSIS — L28.2 PRURITIC RASH: Primary | ICD-10-CM

## 2019-06-23 PROCEDURE — 74011000250 HC RX REV CODE- 250: Performed by: EMERGENCY MEDICINE

## 2019-06-23 PROCEDURE — 74011250637 HC RX REV CODE- 250/637: Performed by: EMERGENCY MEDICINE

## 2019-06-23 PROCEDURE — 74011250636 HC RX REV CODE- 250/636: Performed by: EMERGENCY MEDICINE

## 2019-06-23 PROCEDURE — 99284 EMERGENCY DEPT VISIT MOD MDM: CPT

## 2019-06-23 PROCEDURE — 96375 TX/PRO/DX INJ NEW DRUG ADDON: CPT

## 2019-06-23 PROCEDURE — 96374 THER/PROPH/DIAG INJ IV PUSH: CPT

## 2019-06-23 RX ORDER — BUMETANIDE 1 MG/1
1 TABLET ORAL 2 TIMES DAILY
COMMUNITY

## 2019-06-23 RX ORDER — FAMOTIDINE 20 MG/1
20 TABLET, FILM COATED ORAL 2 TIMES DAILY
Qty: 20 TAB | Refills: 0 | Status: SHIPPED | OUTPATIENT
Start: 2019-06-23 | End: 2019-07-03

## 2019-06-23 RX ORDER — HYDROXYZINE 25 MG/1
50 TABLET, FILM COATED ORAL
Status: COMPLETED | OUTPATIENT
Start: 2019-06-23 | End: 2019-06-23

## 2019-06-23 RX ORDER — DIPHENHYDRAMINE HYDROCHLORIDE 50 MG/ML
50 INJECTION, SOLUTION INTRAMUSCULAR; INTRAVENOUS
Status: COMPLETED | OUTPATIENT
Start: 2019-06-23 | End: 2019-06-23

## 2019-06-23 RX ORDER — EPINEPHRINE 0.3 MG/.3ML
0.3 INJECTION SUBCUTANEOUS
Qty: 1 SYRINGE | Refills: 0 | Status: SHIPPED | OUTPATIENT
Start: 2019-06-23 | End: 2019-06-23

## 2019-06-23 RX ORDER — ACETAMINOPHEN 500 MG
1000 TABLET ORAL 3 TIMES DAILY
COMMUNITY

## 2019-06-23 RX ORDER — DIPHENHYDRAMINE HCL 25 MG
25 CAPSULE ORAL
Qty: 20 CAP | Refills: 0 | Status: SHIPPED | OUTPATIENT
Start: 2019-06-23 | End: 2019-07-03

## 2019-06-23 RX ORDER — PANTOPRAZOLE SODIUM 40 MG/1
40 TABLET, DELAYED RELEASE ORAL 2 TIMES DAILY
COMMUNITY

## 2019-06-23 RX ORDER — HYDROXYZINE 50 MG/1
50 TABLET, FILM COATED ORAL
Qty: 20 TAB | Refills: 0 | Status: SHIPPED | OUTPATIENT
Start: 2019-06-23 | End: 2019-07-03

## 2019-06-23 RX ORDER — PREDNISONE 10 MG/1
TABLET ORAL
Qty: 21 TAB | Refills: 0 | Status: SHIPPED | OUTPATIENT
Start: 2019-06-23

## 2019-06-23 RX ORDER — LOSARTAN POTASSIUM 100 MG/1
100 TABLET ORAL DAILY
COMMUNITY

## 2019-06-23 RX ORDER — CETIRIZINE HCL 10 MG
10 TABLET ORAL ONCE
Status: COMPLETED | OUTPATIENT
Start: 2019-06-23 | End: 2019-06-23

## 2019-06-23 RX ADMIN — FAMOTIDINE 20 MG: 10 INJECTION, SOLUTION INTRAVENOUS at 04:57

## 2019-06-23 RX ADMIN — DIPHENHYDRAMINE HYDROCHLORIDE 50 MG: 50 INJECTION, SOLUTION INTRAMUSCULAR; INTRAVENOUS at 04:51

## 2019-06-23 RX ADMIN — CETIRIZINE HYDROCHLORIDE 10 MG: 10 TABLET, FILM COATED ORAL at 04:47

## 2019-06-23 RX ADMIN — HYDROXYZINE HYDROCHLORIDE 50 MG: 25 TABLET, FILM COATED ORAL at 04:47

## 2019-06-23 RX ADMIN — METHYLPREDNISOLONE SODIUM SUCCINATE 125 MG: 125 INJECTION, POWDER, FOR SOLUTION INTRAMUSCULAR; INTRAVENOUS at 04:54

## 2019-06-23 NOTE — ED NOTES
____Wong____________________ in to talk with patient and explain plan of care with  understanding and  written & verbal instructions

## 2019-06-23 NOTE — ED PROVIDER NOTES
EMERGENCY DEPARTMENT HISTORY AND PHYSICAL EXAM      Date: 6/23/2019  Patient Name: Clem Mcintosh  Patient Age and Sex: [de-identified] y.o. female    History of Presenting Illness     Chief Complaint   Patient presents with    Allergic Reaction     Patient presents with hives and itching to bilateral arms and legs        History Provided By: Patient    HPI: Clem Mcintosh, [de-identified] y.o. female with PMHx significant for colon cancer, hypertension, osteoporosis presents to the ED with c/o of generalized rash and hives involving bilateral upper arms and bilateral lower legs. States that she notices several days ago but has been scratching the area due to the itching. She is taking Benadryl as needed for itching and last dose was about 6 hours prior to arrival.  She denies any new medications, new soaps, new detergents. She denies any other new environmental exposures. She denies any involvement inside her mouth. She denies any stridor, changes or trouble breathing. Pt denies any other alleviating or exacerbating factors. Additionally, pt specifically denies any recent fever, chills, headache, nausea, vomiting, abdominal pain, CP, SOB, lightheadedness, dizziness, numbness, weakness, BLE swelling, heart palpitations, urinary sxs, diarrhea, constipation, melena, hematochezia, cough, or congestion. PCP: Dominique Gonzalez MD    There are no other complaints, changes or physical findings at this time.      Past History   Past Medical History:  Past Medical History:   Diagnosis Date    Cancer ([facility] [address]     colon    Hypertension     Osteoporosis        Past Surgical History:  Past Surgical History:   Procedure Laterality Date    COLONOSCOPY N/A 2/10/2017    COLONOSCOPY performed by Roxana Skaggs MD at . [address] [facility]CE[facility]/CAUTERY  2/10/2017         HX COLECTOMY  2007    HX HERNIA REPAIR  9/19/12    incisional    HX HYSTERECTOMY      HX TONSILLECTOMY      MI COLONOSCOPY FLX DX W/COLLJ SPEC WHEN PFRMD  9/21/2011            Family History:  Family History   Problem Relation Age of Onset   Camacho Cancer Mother         Pancreas    Liver Disease Father     Hypertension Sister        Social History:  Social History     Tobacco Use    Smoking status: Never Smoker    Smokeless tobacco: Never Used   Substance Use Topics    Alcohol use: No    Drug use: Yes     Types: Prescription, OTC       Allergies: Allergies   Allergen Reactions    Ambien [Zolpidem] Other (comments)     [facility] 2010       Current Medications:  No current facility-administered medications on file prior to encounter. Current Outpatient Medications on File Prior to Encounter   Medication Sig Dispense Refill    pravastatin (PRAVACHOL) 10 mg tablet TAKE 1 TABLET EVERY NIGHT 90 Tab 2    furosemide (LASIX) 20 mg tablet TAKE 1 TABLET BY MOUTH DAILY 90 Tab 3    triamterene-hydroCHLOROthiazide (MAXZIDE) 37.5-25 mg per tablet TAKE 1 TABLET EVERY DAY (DISCONTINUE CHLORTHALIDONE AND LASIX AND CLONIDINE) 90 Tab 3    potassium chloride (K-DUR, KLOR-CON) 20 mEq tablet Take 1 Tab by mouth daily as needed. TAKE 1 TABLET BY MOUTH DAILY 90 Tab 1    furosemide (LASIX) 20 mg tablet Take 1 Tab by mouth daily as needed. 90 Tab 1    traMADol (ULTRAM) 50 mg tablet Take 1 Tab by mouth every eight (8) hours as needed for Pain. Max Daily Amount: 150 mg. 30 Tab 5    cholecalciferol (VITAMIN D3) 1,000 unit cap Take 1 Cap by mouth daily. 30 Cap 1    Eyiepgdi-Rcey-Lyvpsu-Hyalur Ac [id number] mg cap Take 1 Cap by mouth daily.  calcium 600 mg Cap Take 600 mg by mouth daily. Review of Systems   Review of Systems   Constitutional: Negative. Negative for chills and fever. HENT: Negative. Negative for congestion, facial swelling, rhinorrhea, sore throat, trouble swallowing and voice change. Eyes: Negative. Respiratory: Negative. Negative for apnea, cough, chest tightness, shortness of breath and wheezing. Cardiovascular: Negative. Negative for chest pain, palpitations and leg swelling. Gastrointestinal: Negative. Negative for abdominal distention, abdominal pain, blood in stool, constipation, diarrhea, nausea and vomiting. Endocrine: Negative. Negative for cold intolerance, heat intolerance and polyuria. Genitourinary: Negative. Negative for difficulty urinating, dysuria, flank pain, frequency, hematuria and urgency. Musculoskeletal: Negative. Negative for arthralgias, back pain, myalgias, neck pain and neck stiffness. Skin: Positive for rash. Negative for color change. Neurological: Negative. Negative for dizziness, syncope, facial asymmetry, speech difficulty, weakness, light-headedness, numbness and headaches. Hematological: Negative. Does not bruise/bleed easily. Psychiatric/Behavioral: Negative. Negative for confusion and self-injury. The patient is not nervous/anxious. Physical Exam   Physical Exam   Constitutional: She is oriented to person, place, and time. She appears well-developed and well-nourished. No distress. HENT:   Head: Normocephalic and atraumatic. Mouth/Throat: Oropharynx is clear and moist. No oropharyngeal exudate. Eyes: Pupils are equal, round, and reactive to light. Conjunctivae and EOM are normal.   Neck: Normal range of motion. Cardiovascular: Normal rate, regular rhythm and normal heart sounds. Exam reveals no gallop and no friction rub. No murmur heard. Pulmonary/Chest: Effort normal and breath sounds normal. No respiratory distress. She has no wheezes. She has no rales. She exhibits no tenderness. Abdominal: Soft. Bowel sounds are normal. She exhibits no distension and no mass. There is no tenderness. There is no rebound and no guarding. Musculoskeletal: Normal range of motion. She exhibits no edema, tenderness or deformity. Neurological: She is alert and oriented to person, place, and time. She displays normal reflexes. No cranial nerve deficit.  She exhibits normal muscle tone. Coordination normal.   Skin: Skin is warm. Rash (scattered localized hives of BLE, no bullae or blisters) noted. She is not diaphoretic. Psychiatric: She has a normal mood and affect. Nursing note and vitals reviewed. Diagnostic Study Results     Labs -  No results found for this or any previous visit (from the past 24 hour(s)). Radiologic Studies -   No orders to display     CT Results  (Last 48 hours)    None        CXR Results  (Last 48 hours)    None          Medical Decision Making   I am the first provider for this patient. I reviewed the vital signs, available nursing notes, past medical history, past surgical history, family history and social history. Vital Signs-Reviewed the patient's vital signs. Patient Vitals for the past 24 hrs:   Temp Pulse Resp BP SpO2   06/23/19 0503 -- -- -- -- 98 %   06/23/19 0501 -- -- -- 144/71 --   06/23/19 0401 98.3 °F (36.8 °C) 72 16 140/83 100 %       Pulse Oximetry Analysis - 100% on RA    Cardiac Monitor:   Rate: 72 bpm  Rhythm: Normal Sinus Rhythm      Records Reviewed: Nursing Notes, Old Medical Records, Previous electrocardiograms, Previous Radiology Studies and Previous Laboratory Studies    Provider Notes (Medical Decision Making):   Patient presents with rash; stable vitals and without constitutional symptoms. No mucosal involvement. DDx: allergic reaction, contact dermatitis, viral exanthem, staph infection. Will treat with benadryl, pepcid, steroids. No indication for EpiPen at this time. Will refer to PCP and Allergist PRN. Instructions given to patient to use over the counter benadryl 50mg every 6 hours until the rash resolves. Please also take Steroids as prescribed for next few days and Pepcid twice a day as prescribed. Call if symptoms persist or worsen. If you have shortness of breath or severe lip/tongue swelling, return to the ER immediately. ED Course:   Initial assessment performed.  The patients presenting problems have been discussed, and they are in agreement with the care plan formulated and outlined with them. I have encouraged them to ask questions as they arise throughout their visit. ALCOHOL/SUBSTANCE ABUSE COUNSELING:  Upon evaluation, pt endorsed recent alcohol/illicit drug use. For approximately 15 minutes, pt has been counseled on the dangers of alcohol and illicit drug use on their health, and they were encouraged to quit as soon as possible in order to decrease further risks to their health. Pt has conveyed their understanding of the risks involved should they continue to use these products. HYPERTENSION COUNSELING   Education was provided to the patient today regarding their hypertension. Patient is made aware of their elevated blood pressure and is instructed to follow up this week with their Primary Care for a recheck. Patient is counseled regarding consequences of chronic, uncontrolled hypertension including kidney disease, heart disease, stroke or even death. Patient states their understanding and agrees to follow up this week. Additionally, during their visit, I discussed sodium restriction, maintaining ideal body weight and regular exercise program as physiologic means to achieve blood pressure control. The patient will strive towards this. I reviewed our electronic medical record system for any past medical records that were available that may contribute to the patient's current condition, the nursing notes and vital signs from today's visit.   Kwaku Marcus MD    Medications Administered During ED Course:  Medications   methylPREDNISolone (PF) (Solu-MEDROL) injection 125 mg (125 mg IntraVENous Given 6/23/19 0454)   diphenhydrAMINE (BENADRYL) injection 50 mg (50 mg IntraVENous Given 6/23/19 0451)   famotidine (PF) (PEPCID) 20 mg in sodium chloride 0.9% 10 mL injection (20 mg IntraVENous Given 6/23/19 0457)   hydrOXYzine HCl (ATARAX) tablet 50 mg (50 mg Oral Given 6/23/19 0447)   cetirizine (ZYRTEC) tablet 10 mg (10 mg Oral Given 6/23/19 3797)     Progress Note:  Patient has been reassessed and reports feeling better and symptoms have improved after ED treatment. Sarika Ibrahim is able to tolerate PO and ambulate per baseline. Sadie Guzman's final labs and imaging have been reviewed with her. She has been counseled regarding her diagnosis. She verbally conveys understanding and agreement of the signs, symptoms, diagnosis, treatment and prognosis and additionally agrees to follow up as recommended with Dr. Janeth Calderon MD in 24 - 48 hours. She also agrees with the care-plan and conveys that all of her questions have been answered. I have also put together some discharge instructions for her that include: 1) educational information regarding their diagnosis, 2) how to care for their diagnosis at home, as well a 3) list of reasons why they would want to return to the ED prior to their follow-up appointment, should their condition change. I have answered all questions to the patient's satisfaction. Strict return precautions given. She both understood and agreed with plan as discussed above. Vital signs stable for discharge. Disposition: DISCHARGE     The pt is ready for discharge. The pt's signs, symptoms, diagnosis, and discharge instructions have been discussed and pt has conveyed their understanding. The pt is to follow up as recommended or return to ER should their symptoms worsen. Plan has been discussed and pt is in agreement. PLAN:  1. Current Discharge Medication List      START taking these medications    Details   predniSONE (STERAPRED DS) 10 mg dose pack Take as prescribed  Qty: 21 Tab, Refills: 0      famotidine (PEPCID) 20 mg tablet Take 1 Tab by mouth two (2) times a day for 10 days. Qty: 20 Tab, Refills: 0      diphenhydrAMINE (BENADRYL) 25 mg capsule Take 1 Cap by mouth every six (6) hours as needed for Itching or Skin Irritation for up to 10 days.   Qty: 20 Cap, Refills: 0      hydrOXYzine HCl (ATARAX) 50 mg tablet Take 1 Tab by mouth every six (6) hours as needed for Itching for up to 10 days. Qty: 20 Tab, Refills: 0      EPINEPHrine (EPIPEN) 0.3 mg/0.3 mL injection 0.3 mL by IntraMUSCular route once as needed for Anaphylaxis for up to 1 dose. Qty: 1 Syringe, Refills: 0         CONTINUE these medications which have NOT CHANGED    Details   pravastatin (PRAVACHOL) 10 mg tablet TAKE 1 TABLET EVERY NIGHT  Qty: 90 Tab, Refills: 2      !! furosemide (LASIX) 20 mg tablet TAKE 1 TABLET BY MOUTH DAILY  Qty: 90 Tab, Refills: 3      triamterene-hydroCHLOROthiazide (MAXZIDE) 37.5-25 mg per tablet TAKE 1 TABLET EVERY DAY (DISCONTINUE CHLORTHALIDONE AND LASIX AND CLONIDINE)  Qty: 90 Tab, Refills: 3      potassium chloride (K-DUR, KLOR-CON) 20 mEq tablet Take 1 Tab by mouth daily as needed. TAKE 1 TABLET BY MOUTH DAILY  Qty: 90 Tab, Refills: 1      !! furosemide (LASIX) 20 mg tablet Take 1 Tab by mouth daily as needed. Qty: 90 Tab, Refills: 1    Comments: **Patient requests 90 days supply**      traMADol (ULTRAM) 50 mg tablet Take 1 Tab by mouth every eight (8) hours as needed for Pain. Max Daily Amount: 150 mg.  Qty: 30 Tab, Refills: 5      cholecalciferol (VITAMIN D3) 1,000 unit cap Take 1 Cap by mouth daily. Qty: 30 Cap, Refills: 1      Xzcjugwb-Qljl-Efgpbx-Hyalur Ac 672-545-25-2 mg cap Take 1 Cap by mouth daily. calcium 600 mg Cap Take 600 mg by mouth daily. !! - Potential duplicate medications found. Please discuss with provider.         2.   Follow-up Information     Follow up With Specialties Details Why Contact Info    Giovany Corral MD Internal Medicine   [address] [facility] [address]  [address] [address]  [phone]      [facility] EMERGENCY DEPT Emergency Medicine  As needed, If symptoms worsen [address]  [address] [facility]  [phone]    [facility]  Schedule an appointment as soon as possible for a visit in 1 day [address]  [address] [facility] [address]          Return to ED if worse  Diagnosis     Clinical Impression:   1. Pruritic rash    2. Localized hives    3. Essential hypertension    4. Dermatitis        Attestation:  I personally performed the services described in this documentation on this date 6/23/2019 for patient [address] [facility]. Nahid Urbina MD    Please note that this dictation was completed with [employer], the computer voice recognition software. Quite often unanticipated grammatical, syntax, homophones, and other interpretive errors are inadvertently transcribed by the computer software. Please disregard these errors. Please excuse any errors that have escaped final proofreading. This note will not be viewable in [address]

## 2019-06-23 NOTE — DISCHARGE INSTRUCTIONS
Thank you for allowing us to take care of you today! We hope we addressed all of your concerns and needs. We strive to provide excellent quality care in the Emergency Department. You will receive a survey after your visit to evaluate the care you were provided. Should you receive a survey from us, we invite you to share your experience and tell us what made it excellent. It was a pleasure serving you, we invite you to share your experience with us, in our pursuit for excellence, should you be selected to receive a survey. The exam and treatment you received in the Emergency Department were for an urgent problem and are not intended as complete care. It is important that you follow up with a doctor, nurse practitioner, or physician assistant for ongoing care. If your symptoms become worse or you do not improve as expected and you are unable to reach your usual health care provider, you should return to the Emergency Department. We are available 24 hours a day. Please take your discharge instructions with you when you go to your follow-up appointment. If you have any problem arranging a follow-up appointment, contact the Emergency Department immediately. If a prescription has been provided, please have it filled as soon as possible to prevent a delay in treatment. Read the entire medication instruction sheet provided to you by the pharmacy. If you have any questions or reservations about taking the medication due to side effects or interactions with other medications, please call your primary care physician or contact the ER to speak with the charge nurse. Make an appointment with your family doctor or the physician you were referred to for follow-up of this visit as instructed on your discharge paperwork, as this is mandatory follow-up. Return to the ER if you are unable to be seen or if you are unable to be seen in a timely manner.     If you have any problem arranging the follow-up visit, contact the Emergency Department immediately. I hope you feel better and thank you again for allow us to provide you with excellent care today at [facility]! Warmest regards,    Morenita Mendieta MD  Emergency Medicine Physician  [facility]              Patient Education        Hives: Care Instructions  Your Care Instructions  Hives are raised, red, itchy patches of skin. They are also called wheals or welts. They usually have red borders and pale centers. Hives range in size from ¼ inch to 3 inches or more across. They may seem to move from place to place on the skin. Several hives may form a large area of raised, red skin. You can get hives after an insect sting, after taking medicine or eating certain foods, or because of infection or stress. Other causes include plants, things you breathe in, makeup, heat, cold, sunlight, and latex. You cannot spread hives to other people. Hives may last a few minutes or a few days, but a single spot may last less than 36 hours. Follow-up care is a key part of your treatment and safety. Be sure to make and go to all appointments, and call your doctor if you are having problems. It's also a good idea to know your test results and keep a list of the medicines you take. How can you care for yourself at home? · Avoid whatever you think may have caused your hives, such as a certain food or medicine. However, you may not know the cause. · Put a cool, wet towel on the area to relieve itching. · Take an over-the-counter antihistamine, such as diphenhydramine (Benadryl), cetirizine (Zyrtec), or loratadine (Claritin), to help stop the hives and calm the itching. Read and follow directions on the label. These medicines can make you feel sleepy. Do not drive while using them. · Stay away from strong soaps, detergents, and chemicals. These can make itching worse. When should you call for help?   Call 46 anytime you think you may need emergency care. For example, call if:    · You have symptoms of a severe allergic reaction. These may include:  ? Sudden raised, red areas (hives) all over your body. ? Swelling of the throat, mouth, lips, or tongue. ? Trouble breathing. ? Passing out (losing consciousness). Or you may feel very lightheaded or suddenly feel weak, confused, or restless.    Call your doctor now or seek immediate medical care if:    · You have symptoms of an allergic reaction, such as:  ? A rash or hives (raised, red areas on the skin). ? Itching. ? Swelling. ? Belly pain, nausea, or vomiting.     · You get hives after you start a new medicine.     · Hives have not gone away after 24 hours.    Watch closely for changes in your health, and be sure to contact your doctor if:    · You do not get better as expected. Where can you learn more? Go to http://ericka-ben.info/. Enter K993 in the search box to learn more about \"Hives: Care Instructions. \"  Current as of: September 23, 2018  Content Version: 11.9  © 7607-7231 [employer]. Care instructions adapted under license by [employer] (which disclaims liability or warranty for this information). If you have questions about a medical condition or this instruction, always ask your healthcare professional. [address] any warranty or liability for your use of this information.

## 2020-01-27 ENCOUNTER — APPOINTMENT (OUTPATIENT)
Dept: ULTRASOUND IMAGING | Age: 80
End: 2020-01-27
Attending: PHYSICIAN ASSISTANT
Payer: MEDICARE

## 2020-01-27 ENCOUNTER — APPOINTMENT (OUTPATIENT)
Dept: GENERAL RADIOLOGY | Age: 80
End: 2020-01-27
Attending: PHYSICIAN ASSISTANT
Payer: MEDICARE

## 2020-01-27 ENCOUNTER — HOSPITAL ENCOUNTER (EMERGENCY)
Age: 80
Discharge: HOME | End: 2020-01-27
Attending: EMERGENCY MEDICINE
Payer: MEDICARE

## 2020-01-27 VITALS
TEMPERATURE: 98.2 F | RESPIRATION RATE: 16 BRPM | HEART RATE: 72 BPM | SYSTOLIC BLOOD PRESSURE: 166 MMHG | DIASTOLIC BLOOD PRESSURE: 72 MMHG

## 2020-01-27 DIAGNOSIS — Z96.652 STATUS POST TOTAL LEFT KNEE REPLACEMENT: ICD-10-CM

## 2020-01-27 DIAGNOSIS — G89.18 ACUTE POSTOPERATIVE PAIN OF LEFT KNEE: Primary | ICD-10-CM

## 2020-01-27 DIAGNOSIS — M25.562 ACUTE POSTOPERATIVE PAIN OF LEFT KNEE: Primary | ICD-10-CM

## 2020-01-27 LAB
ALBUMIN SERPL-MCNC: 3 G/DL (ref 3.5–5)
ALBUMIN/GLOB SERPL: 0.7 {RATIO} (ref 1.1–2.2)
ALP SERPL-CCNC: 82 U/L (ref 45–117)
ALT SERPL-CCNC: 13 U/L (ref 12–78)
ANION GAP SERPL CALC-SCNC: 5 MMOL/L (ref 5–15)
AST SERPL-CCNC: 16 U/L (ref 15–37)
BASOPHILS # BLD: 0 K/UL (ref 0–0.1)
BASOPHILS NFR BLD: 0 % (ref 0–1)
BILIRUB SERPL-MCNC: 0.5 MG/DL (ref 0.2–1)
BUN SERPL-MCNC: 14 MG/DL (ref 6–20)
BUN/CREAT SERPL: 13 (ref 12–20)
CALCIUM SERPL-MCNC: 8.6 MG/DL (ref 8.5–10.1)
CHLORIDE SERPL-SCNC: 104 MMOL/L (ref 97–108)
CO2 SERPL-SCNC: 33 MMOL/L (ref 21–32)
CREAT SERPL-MCNC: 1.08 MG/DL (ref 0.55–1.02)
DIFFERENTIAL METHOD BLD: ABNORMAL
EOSINOPHIL # BLD: 0.3 K/UL (ref 0–0.4)
EOSINOPHIL NFR BLD: 2 % (ref 0–7)
ERYTHROCYTE [DISTWIDTH] IN BLOOD BY AUTOMATED COUNT: 15.1 % (ref 11.5–14.5)
GLOBULIN SER CALC-MCNC: 4.1 G/DL (ref 2–4)
GLUCOSE SERPL-MCNC: 100 MG/DL (ref 65–100)
HCT VFR BLD AUTO: 37.8 % (ref 35–47)
HGB BLD-MCNC: 12.6 G/DL (ref 11.5–16)
IMM GRANULOCYTES # BLD AUTO: 0 K/UL (ref 0–0.04)
IMM GRANULOCYTES NFR BLD AUTO: 0 % (ref 0–0.5)
LYMPHOCYTES # BLD: 3.2 K/UL (ref 0.8–3.5)
LYMPHOCYTES NFR BLD: 30 % (ref 12–49)
MCH RBC QN AUTO: 30.8 PG (ref 26–34)
MCHC RBC AUTO-ENTMCNC: 33.3 G/DL (ref 30–36.5)
MCV RBC AUTO: 92.4 FL (ref 80–99)
MONOCYTES # BLD: 0.9 K/UL (ref 0–1)
MONOCYTES NFR BLD: 8 % (ref 5–13)
NEUTS SEG # BLD: 6.1 K/UL (ref 1.8–8)
NEUTS SEG NFR BLD: 60 % (ref 32–75)
NRBC # BLD: 0 K/UL (ref 0–0.01)
NRBC BLD-RTO: 0 PER 100 WBC
PLATELET # BLD AUTO: 333 K/UL (ref 150–400)
PMV BLD AUTO: 9.5 FL (ref 8.9–12.9)
POTASSIUM SERPL-SCNC: 3 MMOL/L (ref 3.5–5.1)
PROT SERPL-MCNC: 7.1 G/DL (ref 6.4–8.2)
RBC # BLD AUTO: 4.09 M/UL (ref 3.8–5.2)
SODIUM SERPL-SCNC: 142 MMOL/L (ref 136–145)
WBC # BLD AUTO: 10.5 K/UL (ref 3.6–11)

## 2020-01-27 PROCEDURE — 74011250637 HC RX REV CODE- 250/637: Performed by: PHYSICIAN ASSISTANT

## 2020-01-27 PROCEDURE — 85025 COMPLETE CBC W/AUTO DIFF WBC: CPT

## 2020-01-27 PROCEDURE — 99283 EMERGENCY DEPT VISIT LOW MDM: CPT

## 2020-01-27 PROCEDURE — 36415 COLL VENOUS BLD VENIPUNCTURE: CPT

## 2020-01-27 PROCEDURE — 87040 BLOOD CULTURE FOR BACTERIA: CPT

## 2020-01-27 PROCEDURE — 93970 EXTREMITY STUDY: CPT

## 2020-01-27 PROCEDURE — 73562 X-RAY EXAM OF KNEE 3: CPT

## 2020-01-27 PROCEDURE — 80053 COMPREHEN METABOLIC PANEL: CPT

## 2020-01-27 RX ORDER — OXYCODONE HYDROCHLORIDE 5 MG/1
5 TABLET ORAL
Status: COMPLETED | OUTPATIENT
Start: 2020-01-27 | End: 2020-01-27

## 2020-01-27 RX ADMIN — OXYCODONE 5 MG: 5 TABLET ORAL at 16:56

## 2020-01-27 NOTE — ED NOTES
Pt provided with discharge paperwork. Pt verbalized understanding of discharge paperwork and follow up information. Iv removed as noted. This rn assisted pt out of ed in wheelchair to daughters car.

## 2020-01-27 NOTE — ED TRIAGE NOTES
Pt arrives to the ed via ems for c/o left leg pain. Per pt she had left knee replacement on the 22 of January. Pt states since then she has had a lot of pain and swelling to her left leg. Pt leg noted to be red, swollen, and warm to the touch.

## 2020-01-27 NOTE — ED PROVIDER NOTES
EMERGENCY DEPARTMENT HISTORY AND PHYSICAL EXAM      Date: 1/27/2020  Patient Name: Kevon Harry    History of Presenting Illness     Chief Complaint   Patient presents with    Knee Injury     Post Left Knee replacement. Signinifcant pain. Pain from knee to foot. Swelling and heat to knee. History Provided By: Patient    HPI: Kevon Harry, [de-identified] y.o. female presents with her son to the ED with cc of several days of 10 out of 10 constant, aching left knee pain that is much worse with movement. She tells me she had a total knee replacement on January 22, 2020 and has been doing home physical therapy. The swelling and redness have worsened over the past couple of days and today the home therapist recommend that she be evaluated for this problem. There has been no fever. There has been no shortness of breath. Her surgeon is Dr. Melia Vu and the surgery was performed at [facility], [place].    There are no other complaints, changes, or physical findings at this time. PCP: Latisha Prado MD    Current Outpatient Medications   Medication Sig Dispense Refill    predniSONE (STERAPRED DS) 10 mg dose pack Take as prescribed 21 Tab 0    pantoprazole (PROTONIX) 40 mg tablet Take 40 mg by mouth two (2) times a day.  bumetanide (BUMEX) 1 mg tablet Take 1 mg by mouth two (2) times a day.  losartan (COZAAR) 100 mg tablet Take 100 mg by mouth daily.  acetaminophen (PHARBETOL) 500 mg tablet Take 1,000 mg by mouth three (3) times daily.        Past History     Past Medical History:  Past Medical History:   Diagnosis Date    Cancer ([facility] [address]     colon    Hypertension     Osteoporosis        Past Surgical History:  Past Surgical History:   Procedure Laterality Date    COLONOSCOPY N/A 2/10/2017    COLONOSCOPY performed by Karina Short MD at [address]CEP/CAUTERY  2/10/2017         HX COLECTOMY  2007    HX HERNIA REPAIR  9/19/12    incisional    HX HYSTERECTOMY      HX TONSILLECTOMY      RI COLONOSCOPY FLX DX W/COLLJ SPEC WHEN PFRMD  9/21/2011            Family History:  Family History   Problem Relation Age of Onset    Cancer Mother         Pancreas    Liver Disease Father     Hypertension Sister        Social History:  Social History     Tobacco Use    Smoking status: Never Smoker    Smokeless tobacco: Never Used   Substance Use Topics    Alcohol use: No    Drug use: Yes     Types: Prescription, OTC       Allergies: Allergies   Allergen Reactions    Ambien [Zolpidem] Other (comments)     [facility]ized 2010     Review of Systems   Review of Systems   Constitutional: Negative for fatigue and fever. HENT: Negative for ear pain and sore throat. Eyes: Negative for pain, redness and visual disturbance. Respiratory: Negative for cough and shortness of breath. Cardiovascular: Negative for chest pain and palpitations. Gastrointestinal: Negative for abdominal pain, nausea and vomiting. Genitourinary: Negative for dysuria, frequency and urgency. Musculoskeletal: Negative for back pain, gait problem, neck pain and neck stiffness. Left knee pain, redness and swelling   Skin: Negative for rash and wound. Neurological: Negative for dizziness, weakness, light-headedness, numbness and headaches. Physical Exam   Physical Exam  Vitals signs and nursing note reviewed. Constitutional:       General: She is not in acute distress. Appearance: She is well-developed. She is not toxic-appearing. HENT:      Head: Normocephalic and atraumatic. Jaw: No trismus. Right Ear: External ear normal.      Left Ear: External ear normal.      Nose: Nose normal.      Mouth/Throat:      Pharynx: Uvula midline. Eyes:      General: No scleral icterus. Conjunctiva/sclera: Conjunctivae normal.      Pupils: Pupils are equal, round, and reactive to light.    Neck:      Musculoskeletal: Full passive range of motion without pain and normal range of motion. Cardiovascular:      Rate and Rhythm: Normal rate and regular rhythm. Pulmonary:      Effort: Pulmonary effort is normal. No tachypnea, accessory muscle usage or respiratory distress. Breath sounds: No decreased breath sounds or wheezing. Abdominal:      Palpations: Abdomen is soft. Tenderness: There is no abdominal tenderness. Musculoskeletal:      Left knee: She exhibits decreased range of motion. Tenderness found. Comments: LEFT KNEE:  Circumferential swelling and redness of the left lower leg starting just above the knee  Surgical dressing is clean and dry and not saturated with blood  Range of motion is limited secondary to pain  Provocative maneuvers deferred   Skin:     Findings: No rash. Neurological:      Mental Status: She is alert and oriented to person, place, and time. She is not disoriented. GCS: GCS eye subscore is 4. GCS verbal subscore is 5. GCS motor subscore is 6. Cranial Nerves: No cranial nerve deficit. Psychiatric:         Speech: Speech normal.       Diagnostic Study Results     Labs -     Recent Results (from the past 12 hour(s))   CBC WITH AUTOMATED DIFF    Collection Time: 01/27/20  3:39 PM   Result Value Ref Range    WBC 10.5 3.6 - 11.0 K/uL    RBC 4.09 3.80 - 5.20 M/uL    HGB 12.6 11.5 - 16.0 g/dL    HCT 37.8 35.0 - 47.0 %    MCV 92.4 80.0 - 99.0 FL    MCH 30.8 26.0 - 34.0 PG    MCHC 33.3 30.0 - 36.5 g/dL    RDW 15.1 (H) 11.5 - 14.5 %    PLATELET 096 426 - 451 K/uL    MPV 9.5 8.9 - 12.9 FL    NRBC 0.0 0  WBC    ABSOLUTE NRBC 0.00 0.00 - 0.01 K/uL    NEUTROPHILS 60 32 - 75 %    LYMPHOCYTES 30 12 - 49 %    MONOCYTES 8 5 - 13 %    EOSINOPHILS 2 0 - 7 %    BASOPHILS 0 0 - 1 %    IMMATURE GRANULOCYTES 0 0.0 - 0.5 %    ABS. NEUTROPHILS 6.1 1.8 - 8.0 K/UL    ABS. LYMPHOCYTES 3.2 0.8 - 3.5 K/UL    ABS. MONOCYTES 0.9 0.0 - 1.0 K/UL    ABS. EOSINOPHILS 0.3 0.0 - 0.4 K/UL    ABS. BASOPHILS 0.0 0.0 - 0.1 K/UL    ABS. IMM.  GRANS. 0.0 0.00 - 0.04 K/UL    DF AUTOMATED     METABOLIC PANEL, COMPREHENSIVE    Collection Time: 01/27/20  3:39 PM   Result Value Ref Range    Sodium 142 136 - 145 mmol/L    Potassium 3.0 (L) 3.5 - 5.1 mmol/L    Chloride 104 97 - 108 mmol/L    CO2 33 (H) 21 - 32 mmol/L    Anion gap 5 5 - 15 mmol/L    Glucose 100 65 - 100 mg/dL    BUN 14 6 - 20 MG/DL    Creatinine 1.08 (H) 0.55 - 1.02 MG/DL    BUN/Creatinine ratio 13 12 - 20      GFR est AA 59 (L) >60 ml/min/1.73m2    GFR est non-AA 49 (L) >60 ml/min/1.73m2    Calcium 8.6 8.5 - 10.1 MG/DL    Bilirubin, total 0.5 0.2 - 1.0 MG/DL    ALT (SGPT) 13 12 - 78 U/L    AST (SGOT) 16 15 - 37 U/L    Alk. phosphatase 82 45 - 117 U/L    Protein, total 7.1 6.4 - 8.2 g/dL    Albumin 3.0 (L) 3.5 - 5.0 g/dL    Globulin 4.1 (H) 2.0 - 4.0 g/dL    A-G Ratio 0.7 (L) 1.1 - 2.2         Radiologic Studies -   XR KNEE LT 3 V   Final Result   IMPRESSION: Some detail obscured by the bandage. No osseous destructive change   identified. CT Results  (Last 48 hours)    None        CXR Results  (Last 48 hours)    None        Medical Decision Making   I am the first provider for this patient. I reviewed the vital signs, available nursing notes, past medical history, past surgical history, family history and social history. Vital Signs-Reviewed the patient's vital signs.   Patient Vitals for the past 12 hrs:   Temp Pulse Resp BP   01/27/20 1545 -- -- -- 166/72   01/27/20 1530 -- -- -- 164/62   01/27/20 1515 -- -- -- 161/73   01/27/20 1512 98.2 °F (36.8 °C) 72 16 163/75       Records Reviewed: Nursing Notes, Old Medical Records, Previous Radiology Studies and Previous Laboratory Studies    Provider Notes (Medical Decision Making):   DDx: DVT, cellulitis, status post TKR, hardware failure    6:03 PM  Afebrile; well-appearing; duplex ultrasound is negative for DVT; no leukocytosis or constitutional symptoms; presentation is consistent with status post total knee arthroplasty day 5; believe safe for discharge with close orthopedic follow-up; return precautions    ED Course:   Initial assessment performed. The patients presenting problems have been discussed, and they are in agreement with the care plan formulated and outlined with them. I have encouraged them to ask questions as they arise throughout their visit. Disposition:  Discharge    PLAN:  1. Discharge Medication List as of 1/27/2020  6:08 PM        2. Follow-up Information     Follow up With Specialties Details Why Contact Info    Albaro Tiwari, DO Orthopedic Surgery Schedule an appointment as soon as possible for a visit ORTHO: call tomorrow to schedule follow up [address]  [phone]          Return to ED if worse     Diagnosis     Clinical Impression:   1. Acute postoperative pain of left knee    2.  Status post total left knee replacement

## 2020-02-02 LAB
BACTERIA SPEC CULT: NORMAL
SERVICE CMNT-IMP: NORMAL

## 2020-10-06 ENCOUNTER — HOSPITAL ENCOUNTER (OUTPATIENT)
Dept: BONE DENSITY | Age: 80
Discharge: HOME | End: 2020-10-06
Attending: INTERNAL MEDICINE
Payer: MEDICARE

## 2020-10-06 ENCOUNTER — HOSPITAL ENCOUNTER (OUTPATIENT)
Dept: MAMMOGRAPHY | Age: 80
Discharge: HOME | End: 2020-10-06
Attending: INTERNAL MEDICINE
Payer: MEDICARE

## 2020-10-06 DIAGNOSIS — Z12.31 VISIT FOR SCREENING MAMMOGRAM: ICD-10-CM

## 2020-10-06 DIAGNOSIS — M81.0 SENILE OSTEOPOROSIS: ICD-10-CM

## 2020-10-06 PROCEDURE — 77067 SCR MAMMO BI INCL CAD: CPT

## 2020-10-06 PROCEDURE — 77080 DXA BONE DENSITY AXIAL: CPT

## 2021-07-08 ENCOUNTER — HOSPITAL ENCOUNTER (EMERGENCY)
Age: 81
Discharge: HOME | End: 2021-07-09
Attending: EMERGENCY MEDICINE
Payer: MEDICARE

## 2021-07-08 ENCOUNTER — APPOINTMENT (OUTPATIENT)
Dept: CT IMAGING | Age: 81
End: 2021-07-08
Attending: EMERGENCY MEDICINE
Payer: MEDICARE

## 2021-07-08 ENCOUNTER — APPOINTMENT (OUTPATIENT)
Dept: GENERAL RADIOLOGY | Age: 81
End: 2021-07-08
Attending: EMERGENCY MEDICINE
Payer: MEDICARE

## 2021-07-08 DIAGNOSIS — R55 SYNCOPE AND COLLAPSE: Primary | ICD-10-CM

## 2021-07-08 DIAGNOSIS — R11.2 NON-INTRACTABLE VOMITING WITH NAUSEA, UNSPECIFIED VOMITING TYPE: ICD-10-CM

## 2021-07-08 LAB
ALBUMIN SERPL-MCNC: 3.8 G/DL (ref 3.5–5)
ALBUMIN/GLOB SERPL: 1 {RATIO} (ref 1.1–2.2)
ALP SERPL-CCNC: 82 U/L (ref 45–117)
ALT SERPL-CCNC: 20 U/L (ref 12–78)
ANION GAP SERPL CALC-SCNC: 7 MMOL/L (ref 5–15)
AST SERPL-CCNC: 18 U/L (ref 15–37)
ATRIAL RATE: 53 BPM
BASOPHILS # BLD: 0 K/UL (ref 0–0.1)
BASOPHILS NFR BLD: 0 % (ref 0–1)
BILIRUB SERPL-MCNC: 0.5 MG/DL (ref 0.2–1)
BUN SERPL-MCNC: 20 MG/DL (ref 6–20)
BUN/CREAT SERPL: 14 (ref 12–20)
CALCIUM SERPL-MCNC: 8.9 MG/DL (ref 8.5–10.1)
CALCULATED P AXIS, ECG09: 51 DEGREES
CALCULATED R AXIS, ECG10: -2 DEGREES
CALCULATED T AXIS, ECG11: 30 DEGREES
CHLORIDE SERPL-SCNC: 107 MMOL/L (ref 97–108)
CO2 SERPL-SCNC: 27 MMOL/L (ref 21–32)
CREAT SERPL-MCNC: 1.43 MG/DL (ref 0.55–1.02)
DIAGNOSIS, 93000: NORMAL
DIFFERENTIAL METHOD BLD: ABNORMAL
EOSINOPHIL # BLD: 0.1 K/UL (ref 0–0.4)
EOSINOPHIL NFR BLD: 1 % (ref 0–7)
ERYTHROCYTE [DISTWIDTH] IN BLOOD BY AUTOMATED COUNT: 14.3 % (ref 11.5–14.5)
GLOBULIN SER CALC-MCNC: 3.9 G/DL (ref 2–4)
GLUCOSE SERPL-MCNC: 160 MG/DL (ref 65–100)
HCT VFR BLD AUTO: 41.3 % (ref 35–47)
HGB BLD-MCNC: 14.4 G/DL (ref 11.5–16)
IMM GRANULOCYTES # BLD AUTO: 0 K/UL (ref 0–0.04)
IMM GRANULOCYTES NFR BLD AUTO: 0 % (ref 0–0.5)
LACTATE BLD-SCNC: 1.61 MMOL/L (ref 0.4–2)
LYMPHOCYTES # BLD: 3.7 K/UL (ref 0.8–3.5)
LYMPHOCYTES NFR BLD: 50 % (ref 12–49)
MCH RBC QN AUTO: 31 PG (ref 26–34)
MCHC RBC AUTO-ENTMCNC: 34.9 G/DL (ref 30–36.5)
MCV RBC AUTO: 88.8 FL (ref 80–99)
MONOCYTES # BLD: 0.6 K/UL (ref 0–1)
MONOCYTES NFR BLD: 8 % (ref 5–13)
NEUTS SEG # BLD: 3.1 K/UL (ref 1.8–8)
NEUTS SEG NFR BLD: 41 % (ref 32–75)
NRBC # BLD: 0 K/UL (ref 0–0.01)
NRBC BLD-RTO: 0 PER 100 WBC
P-R INTERVAL, ECG05: 180 MS
PLATELET # BLD AUTO: 302 K/UL (ref 150–400)
PMV BLD AUTO: 10 FL (ref 8.9–12.9)
POTASSIUM SERPL-SCNC: 3.3 MMOL/L (ref 3.5–5.1)
PROT SERPL-MCNC: 7.7 G/DL (ref 6.4–8.2)
Q-T INTERVAL, ECG07: 474 MS
QRS DURATION, ECG06: 78 MS
QTC CALCULATION (BEZET), ECG08: 444 MS
RBC # BLD AUTO: 4.65 M/UL (ref 3.8–5.2)
SODIUM SERPL-SCNC: 141 MMOL/L (ref 136–145)
TROPONIN I BLD-MCNC: <0.04 NG/ML (ref 0–0.08)
VENTRICULAR RATE, ECG03: 53 BPM
WBC # BLD AUTO: 7.4 K/UL (ref 3.6–11)

## 2021-07-08 PROCEDURE — 83605 ASSAY OF LACTIC ACID: CPT

## 2021-07-08 PROCEDURE — 84484 ASSAY OF TROPONIN QUANT: CPT

## 2021-07-08 PROCEDURE — 99285 EMERGENCY DEPT VISIT HI MDM: CPT

## 2021-07-08 PROCEDURE — 85025 COMPLETE CBC W/AUTO DIFF WBC: CPT

## 2021-07-08 PROCEDURE — 93005 ELECTROCARDIOGRAM TRACING: CPT

## 2021-07-08 PROCEDURE — 36415 COLL VENOUS BLD VENIPUNCTURE: CPT

## 2021-07-08 PROCEDURE — 74011250636 HC RX REV CODE- 250/636: Performed by: EMERGENCY MEDICINE

## 2021-07-08 PROCEDURE — 80053 COMPREHEN METABOLIC PANEL: CPT

## 2021-07-08 PROCEDURE — 96374 THER/PROPH/DIAG INJ IV PUSH: CPT

## 2021-07-08 PROCEDURE — 70450 CT HEAD/BRAIN W/O DYE: CPT

## 2021-07-08 PROCEDURE — 72125 CT NECK SPINE W/O DYE: CPT

## 2021-07-08 PROCEDURE — 96361 HYDRATE IV INFUSION ADD-ON: CPT

## 2021-07-08 RX ORDER — ONDANSETRON 2 MG/ML
4 INJECTION INTRAMUSCULAR; INTRAVENOUS
Status: DISCONTINUED | OUTPATIENT
Start: 2021-07-08 | End: 2021-07-09 | Stop reason: HOSPADM

## 2021-07-08 RX ADMIN — SODIUM CHLORIDE 500 ML: 9 INJECTION, SOLUTION INTRAVENOUS at 22:59

## 2021-07-08 RX ADMIN — ONDANSETRON 4 MG: 2 INJECTION INTRAMUSCULAR; INTRAVENOUS at 22:58

## 2021-07-09 ENCOUNTER — APPOINTMENT (OUTPATIENT)
Dept: GENERAL RADIOLOGY | Age: 81
End: 2021-07-09
Attending: EMERGENCY MEDICINE
Payer: MEDICARE

## 2021-07-09 VITALS
BODY MASS INDEX: 28.52 KG/M2 | TEMPERATURE: 98.2 F | RESPIRATION RATE: 17 BRPM | HEART RATE: 54 BPM | HEIGHT: 62 IN | DIASTOLIC BLOOD PRESSURE: 68 MMHG | OXYGEN SATURATION: 97 % | WEIGHT: 155 LBS | SYSTOLIC BLOOD PRESSURE: 152 MMHG

## 2021-07-09 PROCEDURE — 96361 HYDRATE IV INFUSION ADD-ON: CPT

## 2021-07-09 PROCEDURE — 71045 X-RAY EXAM CHEST 1 VIEW: CPT

## 2021-07-09 RX ORDER — ONDANSETRON 4 MG/1
4 TABLET, ORALLY DISINTEGRATING ORAL
Qty: 10 TABLET | Refills: 0 | Status: SHIPPED | OUTPATIENT
Start: 2021-07-09

## 2021-07-09 NOTE — ED NOTES
Pt incontinent of small amount of stool. Skin cleansed. Clean pads and purewick placed. Soiled underwear placed in trash can at patient's request. Repositioned for comfort on stretcher. Family bedside.

## 2021-07-09 NOTE — ED NOTES
Patient was provided with discharge instructions. Instructions and any medications were reviewed with the patient &/or family by Dr Kamilla Manning. Questions and concerns addressed by the provider. Patient discharged in stable condition via wheelchair and was escorted by Son.

## 2021-07-09 NOTE — ED PROVIDER NOTES
EMERGENCY DEPARTMENT HISTORY AND PHYSICAL EXAM     ----------------------------------------------------------------------------  Please note that this dictation was completed with [employer], the computer voice recognition software. Quite often unanticipated grammatical, syntax, homophones, and other interpretive errors are inadvertently transcribed by the computer software. Please disregard these errors. Please excuse any errors that have escaped final proofreading  ----------------------------------------------------------------------------      Date: 7/8/2021  Patient Name: Talat Jackson    History of Presenting Illness     Chief Complaint   Patient presents with    Dizziness     patient from home, brought in by EMS after family found her on the floor in the laundry room. per ems patient was found next to a basket with vomit in it. History Provided By:  Patient, EMS and family    HPI: Talat Jackson is a 79 y.o. female, with significant pmhx of , who presents via EMS to the ED with c/o dizziness and nausea and vomiting with syncopal episode. Patient reports that she got up this evening to go do laundry. Walking in her lunchroom she notes feeling dizziness sensation and then suddenly woke up on the floor vomiting. Denies any chest pain throughout today or shortness of breath. No recent fever. Notes having 3 episodes of diarrhea earlier today with decreased appetite. Patient is alert and oriented at time of my evaluation and without complaints of pain, nausea or dizziness. Denies headache or changes in vision since the incident. Denies having similar episodes in the past.  Social Hx: denies tobacco  denies EtOH , denies Illicit Drugs    There are no other complaints, changes, or physical findings at this time.      PCP: Unknown, Provider, MD    Allergies   Allergen Reactions    Ambien [Zolpidem] Other (comments)     [facility]-hospitalized 2010       Current Facility-Administered Medications   Medication Dose Route Frequency Provider Last Rate Last Admin    ondansetron [facility] injection 4 mg  4 mg IntraVENous Q1H PRN China Quintero MD   4 mg at 07/08/21 3431     Current Outpatient Medications   Medication Sig Dispense Refill    ondansetron (Zofran ODT) 4 mg disintegrating tablet Take 1 Tablet by mouth every eight (8) hours as needed for Nausea. 10 Tablet 0    predniSONE (STERAPRED DS) 10 mg dose pack Take as prescribed 21 Tab 0    pantoprazole (PROTONIX) 40 mg tablet Take 40 mg by mouth two (2) times a day.  bumetanide (BUMEX) 1 mg tablet Take 1 mg by mouth two (2) times a day.  losartan (COZAAR) 100 mg tablet Take 100 mg by mouth daily.  acetaminophen (PHARBETOL) 500 mg tablet Take 1,000 mg by mouth three (3) times daily. Past History     Past Medical History:  Past Medical History:   Diagnosis Date    Cancer ([facility] [address]     colon    Hypertension     Osteoporosis        Past Surgical History:  Past Surgical History:   Procedure Laterality Date    COLONOSCOPY N/A 2/10/2017    COLONOSCOPY performed by Isa Rodriguez MD at [address]CEP/CAUTERY  2/10/2017         HX HERNIA REPAIR  9/19/12    incisional    HX HYSTERECTOMY      HX TONSILLECTOMY      HX TOTAL COLECTOMY  2007    CO COLONOSCOPY FLX DX W/COLLJ SPEC WHEN PFRMD  9/21/2011            Family History:  Family History   Problem Relation Age of Onset    Cancer Mother         Pancreas    Liver Disease Father     Hypertension Sister        Social History:  Social History     Tobacco Use    Smoking status: Never Smoker    Smokeless tobacco: Never Used   Substance Use Topics    Alcohol use: No    Drug use: Yes     Types: Prescription, OTC       Allergies: Allergies   Allergen Reactions    Ambien [Zolpidem] Other (comments)     [facility]-hospitalized 2010         Review of Systems   Review of Systems   Constitutional: Negative. Negative for fever. Eyes: Negative. Respiratory: Negative.   Negative for shortness of breath. Cardiovascular: Negative for chest pain. Gastrointestinal: Positive for diarrhea, nausea and vomiting. Negative for abdominal pain. Endocrine: Negative. Genitourinary: Negative. Negative for difficulty urinating, dysuria and hematuria. Musculoskeletal: Negative. Skin: Negative. Neurological: Positive for dizziness and syncope. Psychiatric/Behavioral: Negative for suicidal ideas. All other systems reviewed and are negative. Physical Exam   Physical Exam  Vitals and nursing note reviewed. Constitutional:       General: She is not in acute distress. Appearance: She is well-developed. She is not diaphoretic. HENT:      Head: Normocephalic and atraumatic. Nose: Nose normal.   Eyes:      General: No scleral icterus. Conjunctiva/sclera: Conjunctivae normal.   Neck:      Trachea: No tracheal deviation. Cardiovascular:      Rate and Rhythm: Regular rhythm. Bradycardia present. Heart sounds: Normal heart sounds. No murmur heard. No friction rub. Pulmonary:      Effort: Pulmonary effort is normal. No respiratory distress. Breath sounds: Normal breath sounds. No stridor. No wheezing or rales. Abdominal:      General: Bowel sounds are normal. There is no distension. Palpations: Abdomen is soft. Tenderness: There is no abdominal tenderness. There is no rebound. Musculoskeletal:         General: No tenderness. Normal range of motion. Cervical back: Normal range of motion. Skin:     General: Skin is warm and dry. Findings: No rash. Neurological:      Mental Status: She is alert and oriented to person, place, and time. Cranial Nerves: No cranial nerve deficit. Psychiatric:         Speech: Speech normal.         Behavior: Behavior normal.         Thought Content:  Thought content normal.         Judgment: Judgment normal.           Diagnostic Study Results     Labs -     Recent Results (from the past 12 hour(s)) EKG, 12 LEAD, INITIAL    Collection Time: 07/08/21 10:40 PM   Result Value Ref Range    Ventricular Rate 53 BPM    Atrial Rate 53 BPM    P-R Interval 180 ms    QRS Duration 78 ms    Q-T Interval 474 ms    QTC Calculation (Bezet) 444 ms    Calculated P Axis 51 degrees    Calculated R Axis -2 degrees    Calculated T Axis 30 degrees    Diagnosis       Sinus bradycardia  Moderate voltage criteria for LVH, may be normal variant  Nonspecific T wave abnormality  When compared with ECG of 10-SEP-2016 16:18,  Non-specific change in ST segment in Inferior leads  Confirmed by Yasir Orozco P.VAmparo ([id number]) on 7/8/2021 10:54:42 PM     CBC WITH AUTOMATED DIFF    Collection Time: 07/08/21 10:45 PM   Result Value Ref Range    WBC 7.4 3.6 - 11.0 K/uL    RBC 4.65 3.80 - 5.20 M/uL    HGB 14.4 11.5 - 16.0 g/dL    HCT 41.3 35.0 - 47.0 %    MCV 88.8 80.0 - 99.0 FL    MCH 31.0 26.0 - 34.0 PG    MCHC 34.9 30.0 - 36.5 g/dL    RDW 14.3 11.5 - 14.5 %    PLATELET 771 255 - 472 K/uL    MPV 10.0 8.9 - 12.9 FL    NRBC 0.0 0  WBC    ABSOLUTE NRBC 0.00 0.00 - 0.01 K/uL    NEUTROPHILS 41 32 - 75 %    LYMPHOCYTES 50 (H) 12 - 49 %    MONOCYTES 8 5 - 13 %    EOSINOPHILS 1 0 - 7 %    BASOPHILS 0 0 - 1 %    IMMATURE GRANULOCYTES 0 0.0 - 0.5 %    ABS. NEUTROPHILS 3.1 1.8 - 8.0 K/UL    ABS. LYMPHOCYTES 3.7 (H) 0.8 - 3.5 K/UL    ABS. MONOCYTES 0.6 0.0 - 1.0 K/UL    ABS. EOSINOPHILS 0.1 0.0 - 0.4 K/UL    ABS. BASOPHILS 0.0 0.0 - 0.1 K/UL    ABS. IMM.  GRANS. 0.0 0.00 - 0.04 K/UL    DF AUTOMATED     METABOLIC PANEL, COMPREHENSIVE    Collection Time: 07/08/21 10:45 PM   Result Value Ref Range    Sodium 141 136 - 145 mmol/L    Potassium 3.3 (L) 3.5 - 5.1 mmol/L    Chloride 107 97 - 108 mmol/L    CO2 27 21 - 32 mmol/L    Anion gap 7 5 - 15 mmol/L    Glucose 160 (H) 65 - 100 mg/dL    BUN 20 6 - 20 MG/DL    Creatinine 1.43 (H) 0.55 - 1.02 MG/DL    BUN/Creatinine ratio 14 12 - 20      GFR est AA 43 (L) >60 ml/min/1.73m2    GFR est non-AA 35 (L) >60 ml/min/1.73m2 Calcium 8.9 8.5 - 10.1 MG/DL    Bilirubin, total 0.5 0.2 - 1.0 MG/DL    ALT (SGPT) 20 12 - 78 U/L    AST (SGOT) 18 15 - 37 U/L    Alk. phosphatase 82 45 - 117 U/L    Protein, total 7.7 6.4 - 8.2 g/dL    Albumin 3.8 3.5 - 5.0 g/dL    Globulin 3.9 2.0 - 4.0 g/dL    A-G Ratio 1.0 (L) 1.1 - 2.2     POC TROPONIN-I    Collection Time: 07/08/21 10:50 PM   Result Value Ref Range    Troponin-I (POC) <0.04 0.00 - 0.08 ng/mL   POC LACTIC ACID    Collection Time: 07/08/21 10:51 PM   Result Value Ref Range    Lactic Acid (POC) 1.61 0.40 - 2.00 mmol/L       Radiologic Studies -   XR CHEST PORT   Final Result   No acute process. CT HEAD WO CONT   Final Result      No acute traumatic injury. CT SPINE CERV WO CONT   Final Result      No acute fracture. CT Results  (Last 48 hours)               07/08/21 2353  CT HEAD WO CONT Final result    Impression:      No acute traumatic injury. Narrative:  INDICATION:   Syncopal episode       EXAMINATION:  CT HEAD WO CONTRAST       COMPARISON:  November 13, 2015       TECHNIQUE:  Routine noncontrast axial head CT was performed. Sagittal and   coronal reconstructions were generated. CT dose reduction was achieved through   use of a standardized protocol tailored for this examination and automatic   exposure control for dose modulation. FINDINGS:       Ventricles:  Midline, no hydrocephalus. Intracranial Hemorrhage:  None. Brain Parenchyma/Brainstem:  Normal for age. Basal Cisterns:  Normal.    Paranasal Sinuses:  Visualized sinuses are clear. Soft Tissues:  No significant soft tissue swelling. Osseous Structures:  No acute fracture. Additional Comments:  N/A.            07/08/21 2353  CT SPINE CERV WO CONT Final result    Impression:      No acute fracture. Narrative:  INDICATION: Syncopal episode       COMPARISON: None. TECHNIQUE:   Noncontrast axial CT imaging of the cervical spine was performed.     Coronal and sagittal reconstructions were obtained. CT dose reduction was achieved through use of a standardized protocol tailored   for this examination and automatic exposure control for dose modulation. FINDINGS:       There is reversal of lordosis at C3-4. There is no acute fracture or   subluxation. The craniocervical junction is intact. There is no prevertebral   soft tissue swelling. Multilevel degenerative changes most notable at C3-4,   C4-5, C5-6. Lung apices are clear. CXR Results  (Last 48 hours)               07/09/21 0034  XR CHEST PORT Final result    Impression:  No acute process. Narrative:  INDICATION: Syncopal episode       EXAM:  AP CHEST RADIOGRAPH       COMPARISON: None       FINDINGS:       AP portable view of the chest demonstrates a normal cardiomediastinal   silhouette. There is no edema, effusion, consolidation, or pneumothorax. The   osseous structures are unremarkable. Medical Decision Making   I am the first provider for this patient. I reviewed the vital signs, available nursing notes, past medical history, past surgical history, family history and social history. Vital Signs-Reviewed the patient's vital signs. Patient Vitals for the past 12 hrs:   Temp Pulse Resp BP SpO2   07/09/21 0200 -- (!) 54 17 (!) 152/68 97 %   07/09/21 0030 -- (!) 50 13 (!) 162/73 99 %   07/09/21 0003 -- (!) 55 18 (!) 139/125 --   07/08/21 2239 98.2 °F (36.8 °C) 65 16 139/71 99 %       Pulse Oximetry Analysis - 99% on RA, normal  Rate: 55 bpm  Rhythm: Sinus bradycardia      Provider Notes (Medical Decision Making):     DDX:  Orthostatic syncope, vertigo, gastroenteritis, dehydration, electrolyte abnormality    Plan:  Labs, UA, IV fluids, antiemetic as needed, head CT, C-spine CT, chest x-ray, orthostatic vital signs    Impression:  Syncope and collapse, nausea vomiting    ED Course:   Initial assessment performed.  The patients presenting problems have been discussed, and they are in agreement with the care plan formulated and outlined with them. I have encouraged them to ask questions as they arise throughout their visit. I reviewed the nursing notes and and vital signs from today's visit, as well as the electronic medical record system for any past medical records that were available that may contribute to the patients current condition, including previous ER visit for postoperative pain of left knee in 2020    Nursing notes will be reviewed as they become available in realtime while the pt has been in the ED. Rock Wu MD    EKG interpretation 2240: Sinus bradycardia, nl Axis, rate 53; , QRS 78, QTc 444; NO STEMI; interpreted by Rock Wu MD    I personally reviewed/interpreted pt's imaging. Agree with official read by radiology as noted above. Rock Wu MD      2:16 AMProgress note:  Pt noted to be feeling better. No further episodes of vomiting, dizziness or syncope. Negative CT imaging, orthostatic testing and able to ambulate without recurrence of symptoms. , ready for discharge. Discussed lab and imaging findings with pt, specifically noting no evidence of intracranial bleed or pathologic finding on chest x-ray. Pt will follow up with primary care as instructed. All questions have been answered, pt voiced understanding and agreement with plan. Specific return precautions provided in addition to instructions for pt to return to the ED immediately should sx worsen at any time. Rock Wu MD           Critical Care Time:     none      Diagnosis     Clinical Impression:   1. Syncope and collapse    2. Non-intractable vomiting with nausea, unspecified vomiting type        PLAN:  1. Current Discharge Medication List      START taking these medications    Details   ondansetron (Zofran ODT) 4 mg disintegrating tablet Take 1 Tablet by mouth every eight (8) hours as needed for Nausea.   Qty: 10 Tablet, Refills: 0  Start date: 7/9/2021 2.   Follow-up Information     Follow up With Specialties Details Why Contact Info    Your PCP            Return to ED if worse     Disposition:  2:18 AM   The patient's results have been reviewed with family and/or caregiver. They verbally convey their understanding and agreement of the patient's signs, symptoms, diagnosis, treatment and prognosis and additionally agree to follow up as recommended in the discharge instructions or to return to the Emergency Room should the patient's condition change prior to their follow-up appointment. The family and/or caregiver verbally agrees with the care-plan and all of their questions have been answered. The discharge instructions have also been provided to the them with educational information regarding the patient's diagnosis as well a list of reasons why the patient would want to return to the ER prior to their follow-up appointment should their condition change.   Amy Benz MD

## 2021-07-09 NOTE — DISCHARGE INSTRUCTIONS
It was a pleasure taking care of you in our Emergency Department today. We know that when you come to [facility], you are entrusting us with your health, comfort, and safety. Our physicians and nurses honor that trust, and truly appreciate the opportunity to care for you and your loved ones. We also value your feedback. If you receive a survey about your Emergency Department experience today, please fill it out. We care about our patients' feedback, and we listen to what you have to say. Thank you!       Dr. Kt Marlow MD.

## 2021-08-01 ENCOUNTER — HOSPITAL ENCOUNTER (EMERGENCY)
Age: 81
Discharge: HOME | End: 2021-08-01
Attending: EMERGENCY MEDICINE
Payer: MEDICARE

## 2021-08-01 ENCOUNTER — APPOINTMENT (OUTPATIENT)
Dept: CT IMAGING | Age: 81
End: 2021-08-01
Attending: EMERGENCY MEDICINE
Payer: MEDICARE

## 2021-08-01 VITALS
TEMPERATURE: 98.5 F | DIASTOLIC BLOOD PRESSURE: 85 MMHG | BODY MASS INDEX: 30.4 KG/M2 | OXYGEN SATURATION: 99 % | WEIGHT: 165.2 LBS | HEIGHT: 62 IN | HEART RATE: 54 BPM | RESPIRATION RATE: 18 BRPM | SYSTOLIC BLOOD PRESSURE: 194 MMHG

## 2021-08-01 DIAGNOSIS — E87.6 HYPOKALEMIA: ICD-10-CM

## 2021-08-01 DIAGNOSIS — R42 LIGHTHEADEDNESS: ICD-10-CM

## 2021-08-01 DIAGNOSIS — I10 MALIGNANT HYPERTENSION: Primary | ICD-10-CM

## 2021-08-01 LAB
ALBUMIN SERPL-MCNC: 3.5 G/DL (ref 3.5–5)
ALBUMIN/GLOB SERPL: 0.9 {RATIO} (ref 1.1–2.2)
ALP SERPL-CCNC: 73 U/L (ref 45–117)
ALT SERPL-CCNC: 15 U/L (ref 12–78)
ANION GAP SERPL CALC-SCNC: 5 MMOL/L (ref 5–15)
AST SERPL-CCNC: 17 U/L (ref 15–37)
BILIRUB SERPL-MCNC: 0.5 MG/DL (ref 0.2–1)
BUN SERPL-MCNC: 12 MG/DL (ref 6–20)
BUN/CREAT SERPL: 11 (ref 12–20)
CALCIUM SERPL-MCNC: 8.2 MG/DL (ref 8.5–10.1)
CHLORIDE SERPL-SCNC: 111 MMOL/L (ref 97–108)
CO2 SERPL-SCNC: 29 MMOL/L (ref 21–32)
CREAT SERPL-MCNC: 1.1 MG/DL (ref 0.55–1.02)
ERYTHROCYTE [DISTWIDTH] IN BLOOD BY AUTOMATED COUNT: 14.8 % (ref 11.5–14.5)
GLOBULIN SER CALC-MCNC: 3.7 G/DL (ref 2–4)
GLUCOSE SERPL-MCNC: 85 MG/DL (ref 65–100)
HCT VFR BLD AUTO: 39.3 % (ref 35–47)
HGB BLD-MCNC: 13.4 G/DL (ref 11.5–16)
MAGNESIUM SERPL-MCNC: 2.2 MG/DL (ref 1.6–2.4)
MCH RBC QN AUTO: 30.2 PG (ref 26–34)
MCHC RBC AUTO-ENTMCNC: 34.1 G/DL (ref 30–36.5)
MCV RBC AUTO: 88.5 FL (ref 80–99)
NRBC # BLD: 0 K/UL (ref 0–0.01)
NRBC BLD-RTO: 0 PER 100 WBC
PLATELET # BLD AUTO: 260 K/UL (ref 150–400)
PMV BLD AUTO: 9.9 FL (ref 8.9–12.9)
POTASSIUM SERPL-SCNC: 2.9 MMOL/L (ref 3.5–5.1)
PROT SERPL-MCNC: 7.2 G/DL (ref 6.4–8.2)
RBC # BLD AUTO: 4.44 M/UL (ref 3.8–5.2)
SODIUM SERPL-SCNC: 145 MMOL/L (ref 136–145)
WBC # BLD AUTO: 6.1 K/UL (ref 3.6–11)

## 2021-08-01 PROCEDURE — 36415 COLL VENOUS BLD VENIPUNCTURE: CPT

## 2021-08-01 PROCEDURE — 99285 EMERGENCY DEPT VISIT HI MDM: CPT

## 2021-08-01 PROCEDURE — 93005 ELECTROCARDIOGRAM TRACING: CPT

## 2021-08-01 PROCEDURE — 74011250637 HC RX REV CODE- 250/637: Performed by: EMERGENCY MEDICINE

## 2021-08-01 PROCEDURE — 80053 COMPREHEN METABOLIC PANEL: CPT

## 2021-08-01 PROCEDURE — 85027 COMPLETE CBC AUTOMATED: CPT

## 2021-08-01 PROCEDURE — 70450 CT HEAD/BRAIN W/O DYE: CPT

## 2021-08-01 PROCEDURE — 83735 ASSAY OF MAGNESIUM: CPT

## 2021-08-01 RX ORDER — POTASSIUM CHLORIDE 750 MG/1
40 TABLET, FILM COATED, EXTENDED RELEASE ORAL
Status: COMPLETED | OUTPATIENT
Start: 2021-08-01 | End: 2021-08-01

## 2021-08-01 RX ORDER — POTASSIUM CHLORIDE 750 MG/1
10 TABLET, FILM COATED, EXTENDED RELEASE ORAL DAILY
Qty: 7 TABLET | Refills: 0 | Status: SHIPPED | OUTPATIENT
Start: 2021-08-01 | End: 2021-08-01 | Stop reason: SDUPTHER

## 2021-08-01 RX ORDER — POTASSIUM CHLORIDE 750 MG/1
10 TABLET, FILM COATED, EXTENDED RELEASE ORAL DAILY
Qty: 7 TABLET | Refills: 0 | Status: SHIPPED | OUTPATIENT
Start: 2021-08-01

## 2021-08-01 RX ORDER — LOSARTAN POTASSIUM 50 MG/1
100 TABLET ORAL
Status: COMPLETED | OUTPATIENT
Start: 2021-08-01 | End: 2021-08-01

## 2021-08-01 RX ADMIN — POTASSIUM CHLORIDE 40 MEQ: 750 TABLET, FILM COATED, EXTENDED RELEASE ORAL at 16:55

## 2021-08-01 RX ADMIN — LOSARTAN POTASSIUM 100 MG: 50 TABLET, FILM COATED ORAL at 16:55

## 2021-08-01 NOTE — ED PROVIDER NOTES
EMERGENCY DEPARTMENT HISTORY AND PHYSICAL EXAM      Date: 8/1/2021  Patient Name: Marga Tanner    History of Presenting Illness     Chief Complaint   Patient presents with    Dizziness     arrives with EMS after grandson called 911 after patient had a GLF at home d/t dizziness. patient is apparently non compliant with her medications; BP elevated at 190/100; ; A & O x 4; denies LOC, SOB or chest pain       History Provided By: Patient and EMS    HPI: Marga Tanner, 79 y.o. female presents to the ED with cc of ground-level fall. Patient felt lightheaded and fell into a closet. She did not lose consciousness, and denies headache. Blood pressure is elevated, she states she has not been taking her blood pressure medication regularly. She denies a spinning sensation, chest pain, nausea, vomiting, shortness of breath. She denies abdominal pain, dysuria, diarrhea or rectal bleeding. She denies cough, fever or chills. There are no other complaints, changes, or physical findings at this time. PCP: Unknown, Provider, MD    No current facility-administered medications on file prior to encounter. Current Outpatient Medications on File Prior to Encounter   Medication Sig Dispense Refill    ondansetron (Zofran ODT) 4 mg disintegrating tablet Take 1 Tablet by mouth every eight (8) hours as needed for Nausea. 10 Tablet 0    predniSONE (STERAPRED DS) 10 mg dose pack Take as prescribed 21 Tab 0    pantoprazole (PROTONIX) 40 mg tablet Take 40 mg by mouth two (2) times a day.  bumetanide (BUMEX) 1 mg tablet Take 1 mg by mouth two (2) times a day.  losartan (COZAAR) 100 mg tablet Take 100 mg by mouth daily.  acetaminophen (PHARBETOL) 500 mg tablet Take 1,000 mg by mouth three (3) times daily.          Past History     Past Medical History:  Past Medical History:   Diagnosis Date    Cancer ([facility] [address]     colon    Hypertension     Osteoporosis        Past Surgical History:  Past Surgical History:   Procedure Laterality Date    COLONOSCOPY N/A 2/10/2017    COLONOSCOPY performed by Gustavo Kenney MD at [address] [facility] [address] [facility],FORCEP/CAUTERY  2/10/2017         HX HERNIA REPAIR  9/19/12    incisional    HX HYSTERECTOMY      HX TONSILLECTOMY      HX TOTAL COLECTOMY  2007    MI COLONOSCOPY FLX DX W/COLLJ SPEC WHEN PFRMD  9/21/2011            Family History:  Family History   Problem Relation Age of Onset    Cancer Mother         Pancreas    Liver Disease Father     Hypertension Sister        Social History:  Social History     Tobacco Use    Smoking status: Never Smoker    Smokeless tobacco: Never Used   Substance Use Topics    Alcohol use: No    Drug use: Yes     Types: Prescription, OTC       Allergies: Allergies   Allergen Reactions    Ambien [Zolpidem] Other (comments)     [facility]ized 2010         Review of Systems   Review of Systems   Constitutional: Negative for fever. HENT: Negative for congestion. Eyes: Negative. Respiratory: Negative for shortness of breath. Cardiovascular: Negative for chest pain. Gastrointestinal: Negative for abdominal pain. Endocrine: Negative for heat intolerance. Genitourinary: Negative for dysuria. Musculoskeletal: Negative for back pain. Skin: Negative for rash. Allergic/Immunologic: Negative for immunocompromised state. Neurological: Positive for light-headedness. Hematological: Does not bruise/bleed easily. Psychiatric/Behavioral: Negative. All other systems reviewed and are negative. Physical Exam   Physical Exam  Vitals and nursing note reviewed. Constitutional:       General: She is not in acute distress. Appearance: She is well-developed. HENT:      Head: Normocephalic. Eyes:      Extraocular Movements: Extraocular movements intact. Cardiovascular:      Rate and Rhythm: Normal rate and regular rhythm. Pulses: Normal pulses. Heart sounds: Normal heart sounds.    Pulmonary: Effort: Pulmonary effort is normal.      Breath sounds: Normal breath sounds. Abdominal:      General: Bowel sounds are normal.      Palpations: Abdomen is soft. Tenderness: There is no abdominal tenderness. Musculoskeletal:         General: Normal range of motion. Cervical back: Normal range of motion and neck supple. Skin:     General: Skin is warm and dry. Neurological:      General: No focal deficit present. Mental Status: She is alert and oriented to person, place, and time. Psychiatric:         Mood and Affect: Mood normal.         Behavior: Behavior normal.         Diagnostic Study Results     Labs -     Recent Results (from the past 12 hour(s))   CBC W/O DIFF    Collection Time: 08/01/21  3:05 PM   Result Value Ref Range    WBC 6.1 3.6 - 11.0 K/uL    RBC 4.44 3.80 - 5.20 M/uL    HGB 13.4 11.5 - 16.0 g/dL    HCT 39.3 35.0 - 47.0 %    MCV 88.5 80.0 - 99.0 FL    MCH 30.2 26.0 - 34.0 PG    MCHC 34.1 30.0 - 36.5 g/dL    RDW 14.8 (H) 11.5 - 14.5 %    PLATELET 607 677 - 676 K/uL    MPV 9.9 8.9 - 12.9 FL    NRBC 0.0 0  WBC    ABSOLUTE NRBC 0.00 0.00 - 6.51 K/uL   METABOLIC PANEL, COMPREHENSIVE    Collection Time: 08/01/21  3:05 PM   Result Value Ref Range    Sodium 145 136 - 145 mmol/L    Potassium 2.9 (L) 3.5 - 5.1 mmol/L    Chloride 111 (H) 97 - 108 mmol/L    CO2 29 21 - 32 mmol/L    Anion gap 5 5 - 15 mmol/L    Glucose 85 65 - 100 mg/dL    BUN 12 6 - 20 MG/DL    Creatinine 1.10 (H) 0.55 - 1.02 MG/DL    BUN/Creatinine ratio 11 (L) 12 - 20      GFR est AA 58 (L) >60 ml/min/1.73m2    GFR est non-AA 48 (L) >60 ml/min/1.73m2    Calcium 8.2 (L) 8.5 - 10.1 MG/DL    Bilirubin, total 0.5 0.2 - 1.0 MG/DL    ALT (SGPT) 15 12 - 78 U/L    AST (SGOT) 17 15 - 37 U/L    Alk.  phosphatase 73 45 - 117 U/L    Protein, total 7.2 6.4 - 8.2 g/dL    Albumin 3.5 3.5 - 5.0 g/dL    Globulin 3.7 2.0 - 4.0 g/dL    A-G Ratio 0.9 (L) 1.1 - 2.2     MAGNESIUM    Collection Time: 08/01/21  3:05 PM   Result Value Ref Range    Magnesium 2.2 1.6 - 2.4 mg/dL   EKG, 12 LEAD, INITIAL    Collection Time: 08/01/21  4:06 PM   Result Value Ref Range    Ventricular Rate 54 BPM    Atrial Rate 54 BPM    P-R Interval 182 ms    QRS Duration 70 ms    Q-T Interval 434 ms    QTC Calculation (Bezet) 411 ms    Calculated P Axis 48 degrees    Calculated R Axis 16 degrees    Calculated T Axis 43 degrees    Diagnosis       Sinus bradycardia  When compared with ECG of 08-JUL-2021 22:40,  No significant change was found         Radiologic Studies -   CT HEAD WO CONT   Final Result   No acute intracranial process seen        CT Results  (Last 48 hours)               08/01/21 1525  CT HEAD WO CONT Final result    Impression:  No acute intracranial process seen       Narrative:  EXAM: CT HEAD WO CONT       INDICATION: Dizziness and hypertension       COMPARISON: 7/8/2021. CONTRAST: None. TECHNIQUE: Unenhanced CT of the head was performed using 5 mm images. Brain and   bone windows were generated. Coronal and sagittal reformats. CT dose reduction   was achieved through use of a standardized protocol tailored for this   examination and automatic exposure control for dose modulation. FINDINGS:   The ventricles and sulci are normal in size, shape and configuration. . There is   no significant white matter disease. There is no intracranial hemorrhage,   extra-axial collection, or mass effect. The basilar cisterns are open. No CT   evidence of acute infarct. The bone windows demonstrate no abnormalities. The visualized portions of the   paranasal sinuses and mastoid air cells are clear. CXR Results  (Last 48 hours)    None          Medical Decision Making   I am the first provider for this patient. I reviewed the vital signs, available nursing notes, past medical history, past surgical history, family history and social history. Vital Signs-Reviewed the patient's vital signs.   Patient Vitals for the past 12 hrs:   Temp Pulse Resp BP SpO2   08/01/21 1730 -- (!) 54 -- (!) 194/85 99 %   08/01/21 1700 -- 61 -- (!) 217/82 98 %   08/01/21 1500 -- (!) 54 -- (!) 201/80 99 %   08/01/21 1452 98.5 °F (36.9 °C) (!) 59 18 (!) 207/79 98 %       EKG interpretation: (Preliminary)  Rhythm: sinus bradycardia; and regular . Rate (approx.): 54; Axis: normal; HI interval: normal; QRS interval: normal ; ST/T wave: normal; Other findings: normal.    Records Reviewed: Nursing Notes, Old Medical Records, Previous electrocardiograms and Ambulance Run Sheet    Provider Notes (Medical Decision Making): Hypertension, intracranial hemorrhage, dehydration, electrolyte abnormality, anemia, arrhythmia    ED Course:   Initial assessment performed. The patients presenting problems have been discussed, and they are in agreement with the care plan formulated and outlined with them. I have encouraged them to ask questions as they arise throughout their visit. Progress note:    Patient is feeling better. Her results were reviewed. She is advised to follow-up and return to ER if worse           Critical Care Time:   0    Disposition:  home    DISCHARGE PLAN:  1. Discharge Medication List as of 8/1/2021  6:06 PM        2. Follow-up Information     Follow up With Specialties Details Why Contact Info    [facility] EMERGENCY DEPT Emergency Medicine  If symptoms worsen [address] [place] [address]  [phone]        These take your blood pressure medication regularly and follow-up with your PCP        3. Return to ED if worse     Diagnosis     Clinical Impression:   1. Malignant hypertension    2. Lightheadedness    3. Hypokalemia        Attestations:    Dante Orona MD    Please note that this dictation was completed with [employer], the [employer] voice recognition software. Quite often unanticipated grammatical, syntax, homophones, and other interpretive errors are inadvertently transcribed by the computer software.   Please disregard these errors. Please excuse any errors that have escaped final proofreading. Thank you.

## 2021-08-02 LAB
ATRIAL RATE: 54 BPM
CALCULATED P AXIS, ECG09: 48 DEGREES
CALCULATED R AXIS, ECG10: 16 DEGREES
CALCULATED T AXIS, ECG11: 43 DEGREES
DIAGNOSIS, 93000: NORMAL
P-R INTERVAL, ECG05: 182 MS
Q-T INTERVAL, ECG07: 434 MS
QRS DURATION, ECG06: 70 MS
QTC CALCULATION (BEZET), ECG08: 411 MS
VENTRICULAR RATE, ECG03: 54 BPM

## 2021-09-13 ENCOUNTER — TRANSCRIBE ORDER (OUTPATIENT)
Dept: SCHEDULING | Age: 81
End: 2021-09-13

## 2021-09-13 DIAGNOSIS — Z12.31 VISIT FOR SCREENING MAMMOGRAM: Primary | ICD-10-CM

## 2022-03-19 PROBLEM — M17.12 PRIMARY OSTEOARTHRITIS OF LEFT KNEE: Status: ACTIVE | Noted: 2017-12-02

## 2022-03-19 PROBLEM — N18.30 CKD (CHRONIC KIDNEY DISEASE) STAGE 3, GFR 30-59 ML/MIN (HCC): Status: ACTIVE | Noted: 2017-12-02

## 2022-11-16 ENCOUNTER — HOSPITAL ENCOUNTER (EMERGENCY)
Age: 82
Discharge: ANOTHER HOSPITAL | End: 2022-11-16
Attending: EMERGENCY MEDICINE
Payer: MEDICARE

## 2022-11-16 ENCOUNTER — APPOINTMENT (OUTPATIENT)
Dept: CT IMAGING | Age: 82
End: 2022-11-16
Attending: STUDENT IN AN ORGANIZED HEALTH CARE EDUCATION/TRAINING PROGRAM
Payer: MEDICARE

## 2022-11-16 VITALS
SYSTOLIC BLOOD PRESSURE: 166 MMHG | HEIGHT: 62 IN | WEIGHT: 162.48 LBS | BODY MASS INDEX: 29.9 KG/M2 | HEART RATE: 60 BPM | RESPIRATION RATE: 16 BRPM | OXYGEN SATURATION: 98 % | TEMPERATURE: 98.1 F | DIASTOLIC BLOOD PRESSURE: 96 MMHG

## 2022-11-16 DIAGNOSIS — I16.0 HYPERTENSIVE URGENCY: ICD-10-CM

## 2022-11-16 DIAGNOSIS — G44.209 ACUTE NON INTRACTABLE TENSION-TYPE HEADACHE: Primary | ICD-10-CM

## 2022-11-16 LAB
ALBUMIN SERPL-MCNC: 3.7 G/DL (ref 3.5–5)
ALBUMIN/GLOB SERPL: 0.9 {RATIO} (ref 1.1–2.2)
ALP SERPL-CCNC: 87 U/L (ref 45–117)
ALT SERPL-CCNC: 16 U/L (ref 12–78)
ANION GAP SERPL CALC-SCNC: 2 MMOL/L (ref 5–15)
AST SERPL-CCNC: 12 U/L (ref 15–37)
BASOPHILS # BLD: 0 K/UL (ref 0–0.1)
BASOPHILS NFR BLD: 1 % (ref 0–1)
BILIRUB SERPL-MCNC: 0.6 MG/DL (ref 0.2–1)
BUN SERPL-MCNC: 11 MG/DL (ref 6–20)
BUN/CREAT SERPL: 10 (ref 12–20)
CALCIUM SERPL-MCNC: 9.1 MG/DL (ref 8.5–10.1)
CHLORIDE SERPL-SCNC: 110 MMOL/L (ref 97–108)
CO2 SERPL-SCNC: 30 MMOL/L (ref 21–32)
CREAT SERPL-MCNC: 1.07 MG/DL (ref 0.55–1.02)
DIFFERENTIAL METHOD BLD: ABNORMAL
EOSINOPHIL # BLD: 0 K/UL (ref 0–0.4)
EOSINOPHIL NFR BLD: 1 % (ref 0–7)
ERYTHROCYTE [DISTWIDTH] IN BLOOD BY AUTOMATED COUNT: 15.3 % (ref 11.5–14.5)
GLOBULIN SER CALC-MCNC: 3.9 G/DL (ref 2–4)
GLUCOSE SERPL-MCNC: 94 MG/DL (ref 65–100)
HCT VFR BLD AUTO: 43.8 % (ref 35–47)
HGB BLD-MCNC: 14.5 G/DL (ref 11.5–16)
IMM GRANULOCYTES # BLD AUTO: 0 K/UL (ref 0–0.04)
IMM GRANULOCYTES NFR BLD AUTO: 1 % (ref 0–0.5)
LYMPHOCYTES # BLD: 2.4 K/UL (ref 0.8–3.5)
LYMPHOCYTES NFR BLD: 40 % (ref 12–49)
MCH RBC QN AUTO: 29.8 PG (ref 26–34)
MCHC RBC AUTO-ENTMCNC: 33.1 G/DL (ref 30–36.5)
MCV RBC AUTO: 89.9 FL (ref 80–99)
MONOCYTES # BLD: 0.4 K/UL (ref 0–1)
MONOCYTES NFR BLD: 7 % (ref 5–13)
NEUTS SEG # BLD: 3 K/UL (ref 1.8–8)
NEUTS SEG NFR BLD: 50 % (ref 32–75)
NRBC # BLD: 0 K/UL (ref 0–0.01)
NRBC BLD-RTO: 0 PER 100 WBC
PLATELET # BLD AUTO: 315 K/UL (ref 150–400)
PMV BLD AUTO: 9.7 FL (ref 8.9–12.9)
POTASSIUM SERPL-SCNC: 3.8 MMOL/L (ref 3.5–5.1)
PROT SERPL-MCNC: 7.6 G/DL (ref 6.4–8.2)
RBC # BLD AUTO: 4.87 M/UL (ref 3.8–5.2)
SODIUM SERPL-SCNC: 142 MMOL/L (ref 136–145)
TROPONIN-HIGH SENSITIVITY: 11 NG/L (ref 0–51)
WBC # BLD AUTO: 5.9 K/UL (ref 3.6–11)

## 2022-11-16 PROCEDURE — 84484 ASSAY OF TROPONIN QUANT: CPT

## 2022-11-16 PROCEDURE — 70450 CT HEAD/BRAIN W/O DYE: CPT

## 2022-11-16 PROCEDURE — 36415 COLL VENOUS BLD VENIPUNCTURE: CPT

## 2022-11-16 PROCEDURE — 93005 ELECTROCARDIOGRAM TRACING: CPT

## 2022-11-16 PROCEDURE — 80053 COMPREHEN METABOLIC PANEL: CPT

## 2022-11-16 PROCEDURE — 74011250637 HC RX REV CODE- 250/637: Performed by: EMERGENCY MEDICINE

## 2022-11-16 PROCEDURE — 99284 EMERGENCY DEPT VISIT MOD MDM: CPT

## 2022-11-16 PROCEDURE — 85025 COMPLETE CBC W/AUTO DIFF WBC: CPT

## 2022-11-16 RX ORDER — AMLODIPINE BESYLATE 5 MG/1
5 TABLET ORAL DAILY
Qty: 30 TABLET | Refills: 0 | Status: SHIPPED | OUTPATIENT
Start: 2022-11-17

## 2022-11-16 RX ORDER — AMLODIPINE BESYLATE 5 MG/1
5 TABLET ORAL
Status: COMPLETED | OUTPATIENT
Start: 2022-11-16 | End: 2022-11-16

## 2022-11-16 RX ADMIN — AMLODIPINE BESYLATE 5 MG: 5 TABLET ORAL at 15:04

## 2022-11-16 NOTE — ED PROVIDER NOTES
EMERGENCY DEPARTMENT HISTORY AND PHYSICAL EXAM      Date: 11/16/2022  Patient Name: Zaynab Louis    History of Presenting Illness     Chief Complaint   Patient presents with    Hypertension     Pt has been having high readings for her bp (089 systolic). Pt reports pain in the back of her head that started Sunday and blurred vision. History Provided By: Patient    HPI: Zaynab Louis, 79 y.o. female presents to the ED with cc of hyperetnsion and headache. Patient states that she has been on losartan for many years. She has been taking her medicine appropriately. For the last 4 days, she has noticed increased blood pressures with systolics being in the 250C. She has also had intermittent headaches, which are worse at night. She denies trauma, fever or chills. She has had intermittent neck pain. She also has had blurred vision. She denies chest pain, shortness of breath, cough or dizziness. When her headache is present, it has been a 7 out of 10 in severity at worst.  She denies any headache currently. She did not take anything for pain prior to arrival.    There are no other complaints, changes, or physical findings at this time. PCP: Unknown, Provider, MD    No current facility-administered medications on file prior to encounter. Current Outpatient Medications on File Prior to Encounter   Medication Sig Dispense Refill    potassium chloride SR (KLOR-CON 10) 10 mEq tablet Take 1 Tablet by mouth daily. 7 Tablet 0    ondansetron (Zofran ODT) 4 mg disintegrating tablet Take 1 Tablet by mouth every eight (8) hours as needed for Nausea. 10 Tablet 0    predniSONE (STERAPRED DS) 10 mg dose pack Take as prescribed 21 Tab 0    pantoprazole (PROTONIX) 40 mg tablet Take 40 mg by mouth two (2) times a day. bumetanide (BUMEX) 1 mg tablet Take 1 mg by mouth two (2) times a day. losartan (COZAAR) 100 mg tablet Take 100 mg by mouth daily.       acetaminophen (PHARBETOL) 500 mg tablet Take 1,000 mg by mouth three (3) times daily. Past History     Past Medical History:  Past Medical History:   Diagnosis Date    Cancer ([facility] [address]     colon    Hypertension     Osteoporosis        Past Surgical History:  Past Surgical History:   Procedure Laterality Date    COLONOSCOPY N/A 2/10/2017    COLONOSCOPY performed by Vickie Mcnally MD at [facility] L[facility],FORCEP/CAUTERY  2/10/2017         HX HERNIA REPAIR  9/19/12    incisional    HX HYSTERECTOMY      HX TONSILLECTOMY      HX TOTAL COLECTOMY  2007    [facility] COLONOSCOPY FLX DX W/COLLJ SPEC WHEN PFRMD  9/21/2011            Family History:  Family History   Problem Relation Age of Onset    Cancer Mother         Pancreas    Liver Disease Father     Hypertension Sister        Social History:  Social History     Tobacco Use    Smoking status: Never    Smokeless tobacco: Never   Substance Use Topics    Alcohol use: No    Drug use: Yes     Types: Prescription, OTC       Allergies: Allergies   Allergen Reactions    Ambien [Zolpidem] Other (comments)     [facility]-hospitalized 2010         Review of Systems   Review of Systems   Constitutional:  Negative for fever. HENT:  Negative for congestion. Eyes:  Positive for visual disturbance. Respiratory:  Negative for shortness of breath. Cardiovascular:  Negative for chest pain. Gastrointestinal:  Negative for abdominal pain. Endocrine: Negative for heat intolerance. Genitourinary: Negative. Musculoskeletal:  Positive for neck pain. Skin:  Negative for rash. Allergic/Immunologic: Negative for immunocompromised state. Neurological:  Positive for headaches. Hematological:  Does not bruise/bleed easily. Psychiatric/Behavioral: Negative. All other systems reviewed and are negative. Physical Exam   Physical Exam  Vitals and nursing note reviewed. Constitutional:       General: She is not in acute distress. Appearance: She is well-developed. HENT:      Head: Normocephalic. Eyes:      Extraocular Movements: Extraocular movements intact. Cardiovascular:      Rate and Rhythm: Normal rate and regular rhythm. Pulses: Normal pulses. Heart sounds: Normal heart sounds. Pulmonary:      Effort: Pulmonary effort is normal.      Breath sounds: Normal breath sounds. Abdominal:      General: Bowel sounds are normal.      Palpations: Abdomen is soft. Tenderness: There is no abdominal tenderness. Musculoskeletal:         General: Normal range of motion. Cervical back: Normal range of motion and neck supple. Skin:     General: Skin is warm and dry. Neurological:      General: No focal deficit present. Mental Status: She is alert and oriented to person, place, and time. Psychiatric:         Mood and Affect: Mood normal.         Behavior: Behavior normal.       Diagnostic Study Results     Labs -     Recent Results (from the past 12 hour(s))   CBC WITH AUTOMATED DIFF    Collection Time: 11/16/22 12:40 PM   Result Value Ref Range    WBC 5.9 3.6 - 11.0 K/uL    RBC 4.87 3.80 - 5.20 M/uL    HGB 14.5 11.5 - 16.0 g/dL    HCT 43.8 35.0 - 47.0 %    MCV 89.9 80.0 - 99.0 FL    MCH 29.8 26.0 - 34.0 PG    MCHC 33.1 30.0 - 36.5 g/dL    RDW 15.3 (H) 11.5 - 14.5 %    PLATELET 457 113 - 296 K/uL    MPV 9.7 8.9 - 12.9 FL    NRBC 0.0 0  WBC    ABSOLUTE NRBC 0.00 0.00 - 0.01 K/uL    NEUTROPHILS 50 32 - 75 %    LYMPHOCYTES 40 12 - 49 %    MONOCYTES 7 5 - 13 %    EOSINOPHILS 1 0 - 7 %    BASOPHILS 1 0 - 1 %    IMMATURE GRANULOCYTES 1 (H) 0.0 - 0.5 %    ABS. NEUTROPHILS 3.0 1.8 - 8.0 K/UL    ABS. LYMPHOCYTES 2.4 0.8 - 3.5 K/UL    ABS. MONOCYTES 0.4 0.0 - 1.0 K/UL    ABS. EOSINOPHILS 0.0 0.0 - 0.4 K/UL    ABS. BASOPHILS 0.0 0.0 - 0.1 K/UL    ABS. IMM.  GRANS. 0.0 0.00 - 0.04 K/UL    DF AUTOMATED     METABOLIC PANEL, COMPREHENSIVE    Collection Time: 11/16/22 12:40 PM   Result Value Ref Range    Sodium 142 136 - 145 mmol/L    Potassium 3.8 3.5 - 5.1 mmol/L    Chloride 110 (H) 97 - 108 mmol/L    CO2 30 21 - 32 mmol/L    Anion gap 2 (L) 5 - 15 mmol/L    Glucose 94 65 - 100 mg/dL    BUN 11 6 - 20 MG/DL    Creatinine 1.07 (H) 0.55 - 1.02 MG/DL    BUN/Creatinine ratio 10 (L) 12 - 20      eGFR 52 (L) >60 ml/min/1.73m2    Calcium 9.1 8.5 - 10.1 MG/DL    Bilirubin, total 0.6 0.2 - 1.0 MG/DL    ALT (SGPT) 16 12 - 78 U/L    AST (SGOT) 12 (L) 15 - 37 U/L    Alk. phosphatase 87 45 - 117 U/L    Protein, total 7.6 6.4 - 8.2 g/dL    Albumin 3.7 3.5 - 5.0 g/dL    Globulin 3.9 2.0 - 4.0 g/dL    A-G Ratio 0.9 (L) 1.1 - 2.2     TROPONIN-HIGH SENSITIVITY    Collection Time: 11/16/22 12:40 PM   Result Value Ref Range    Troponin-High Sensitivity 11 0 - 51 ng/L       Radiologic Studies -   CT HEAD WO CONT   Final Result   No acute intracranial hemorrhage, mass or infarct. Intracranial   atherosclerosis. CT Results  (Last 48 hours)                 11/16/22 1256  CT HEAD WO CONT Final result    Impression:  No acute intracranial hemorrhage, mass or infarct. Intracranial   atherosclerosis. Narrative:  EXAM: Brain CT without contrast.       INDICATION: headache, htn        TECHNIQUE: Noncontrast CT of the brain is performed with 5 mm collimation. Bone   algorithm axial and sagittal and coronal reconstructions performed and   evaluated. CT dose reduction was achieved through use of a standardized   protocol tailored for this examination and automatic exposure control for dose   modulation. COMPARISON: CT 8/1/2021. FINDINGS: There is no acute intracranial hemorrhage, mass, mass effect or   herniation. Ventricular system is normal. The gray-white matter differentiation   is well-preserved. Atherosclerotic calcifications affect the carotid siphons. The mastoid air cells are well pneumatized. The visualized paranasal sinuses are   normal.                 CXR Results  (Last 48 hours)      None            Medical Decision Making   I am the first provider for this patient.     I reviewed the vital signs, available nursing notes, past medical history, past surgical history, family history and social history. Vital Signs-Reviewed the patient's vital signs. Patient Vitals for the past 12 hrs:   Temp Pulse Resp BP SpO2   11/16/22 1504 -- 60 -- (!) 210/98 --   11/16/22 1441 -- -- -- (!) 213/97 --   11/16/22 1439 -- 62 16 (!) 227/97 98 %   11/16/22 1221 98.1 °F (36.7 °C) 60 16 (!) 226/87 --       EKG interpretation: (Preliminary)  Rhythm: sinus bradycardia; and regular . Rate (approx.): 54; Axis: normal; TX interval: normal; QRS interval: normal ; ST/T wave: non-specific changes; Other findings: .    Records Reviewed: Nursing Notes, Old Medical Records, Previous electrocardiograms, Previous Radiology Studies, and Previous Laboratory Studies    Provider Notes (Medical Decision Making): Malignant hypertension, intracranial hemorrhage, tension headache,    ED Course:   Initial assessment performed. The patients presenting problems have been discussed, and they are in agreement with the care plan formulated and outlined with them. I have encouraged them to ask questions as they arise throughout their visit. Progress note: The patient's blood pressure has improved to 166/96, with 5 mg of amlodipine. Her results have been reviewed. She is advised to follow-up and return to ER if worse      Critical Care Time:   0      Disposition:  home    DISCHARGE PLAN:  1. Discharge Medication List as of 11/16/2022  3:57 PM        START taking these medications    Details   amLODIPine (NORVASC) 5 mg tablet Take 1 Tablet by mouth daily. , Normal, Disp-30 Tablet, R-0           CONTINUE these medications which have NOT CHANGED    Details   potassium chloride SR (KLOR-CON 10) 10 mEq tablet Take 1 Tablet by mouth daily. , Normal, Disp-7 Tablet, R-0      ondansetron (Zofran ODT) 4 mg disintegrating tablet Take 1 Tablet by mouth every eight (8) hours as needed for Nausea., Normal, Disp-10 Tablet, R-0      predniSONE (STERAPRED DS) 10 mg dose pack Take as prescribed, Print, Disp-21 Tab, R-0      pantoprazole (PROTONIX) 40 mg tablet Take 40 mg by mouth two (2) times a day., Historical Med      bumetanide (BUMEX) 1 mg tablet Take 1 mg by mouth two (2) times a day., Historical Med      losartan (COZAAR) 100 mg tablet Take 100 mg by mouth daily. , Historical Med      acetaminophen (PHARBETOL) 500 mg tablet Take 1,000 mg by mouth three (3) times daily. , Historical Med           2. Follow-up Information       Follow up With Specialties Details Why Contact Info      In 1 week  See your primary care doctor    [facility] EMERGENCY DEPT Emergency Medicine  If symptoms worsen [address]  [address] [facility]  [phone]          3. Return to ED if worse     Diagnosis     Clinical Impression:   1. Acute non intractable tension-type headache    2. Hypertensive urgency        Attestations:    Mohamud Benitez MD        Please note that this dictation was completed with [employer], the computer voice recognition software. Quite often unanticipated grammatical, syntax, homophones, and other interpretive errors are inadvertently transcribed by the computer software. Please disregard these errors. Please excuse any errors that have escaped final proofreading. Thank you.

## 2022-11-18 LAB
ATRIAL RATE: 54 BPM
CALCULATED P AXIS, ECG09: 71 DEGREES
CALCULATED R AXIS, ECG10: 40 DEGREES
CALCULATED T AXIS, ECG11: 57 DEGREES
DIAGNOSIS, 93000: NORMAL
P-R INTERVAL, ECG05: 168 MS
Q-T INTERVAL, ECG07: 430 MS
QRS DURATION, ECG06: 80 MS
QTC CALCULATION (BEZET), ECG08: 407 MS
VENTRICULAR RATE, ECG03: 54 BPM

## 2023-01-04 ENCOUNTER — TRANSCRIBE ORDER (OUTPATIENT)
Dept: SCHEDULING | Age: 83
End: 2023-01-04

## 2023-01-04 DIAGNOSIS — R55 SYNCOPE AND COLLAPSE: ICD-10-CM

## 2023-01-04 DIAGNOSIS — Z91.81 STATUS POST FALL: Primary | ICD-10-CM

## 2023-01-05 ENCOUNTER — HOSPITAL ENCOUNTER (OUTPATIENT)
Dept: CT IMAGING | Age: 83
Discharge: HOME | End: 2023-01-05
Attending: STUDENT IN AN ORGANIZED HEALTH CARE EDUCATION/TRAINING PROGRAM
Payer: MEDICARE

## 2023-01-05 DIAGNOSIS — R55 SYNCOPE AND COLLAPSE: ICD-10-CM

## 2023-01-05 PROCEDURE — 70450 CT HEAD/BRAIN W/O DYE: CPT

## 2024-01-17 ENCOUNTER — TRANSCRIBE ORDERS (OUTPATIENT)
Facility: HOSPITAL | Age: 84
End: 2024-01-17

## 2024-01-17 DIAGNOSIS — R55 SYNCOPE AND COLLAPSE: Primary | ICD-10-CM

## 2025-03-09 ENCOUNTER — APPOINTMENT (OUTPATIENT)
Facility: HOSPITAL | Age: 85
End: 2025-03-09
Payer: MEDICARE

## 2025-03-09 ENCOUNTER — HOSPITAL ENCOUNTER (EMERGENCY)
Facility: HOSPITAL | Age: 85
Discharge: NURSING, REHAB OR LONG-TERM CARE | End: 2025-03-09
Attending: STUDENT IN AN ORGANIZED HEALTH CARE EDUCATION/TRAINING PROGRAM
Payer: MEDICARE

## 2025-03-09 VITALS
SYSTOLIC BLOOD PRESSURE: 120 MMHG | OXYGEN SATURATION: 94 % | TEMPERATURE: 97.5 F | RESPIRATION RATE: 18 BRPM | HEART RATE: 67 BPM | DIASTOLIC BLOOD PRESSURE: 60 MMHG

## 2025-03-09 DIAGNOSIS — S09.90XA INJURY OF HEAD, INITIAL ENCOUNTER: Primary | ICD-10-CM
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
## 2025-03-09 PROCEDURE — 72125 CT NECK SPINE W/O DYE: CPT

## 2025-03-09 PROCEDURE — 99284 EMERGENCY DEPT VISIT MOD MDM: CPT

## 2025-03-09 PROCEDURE — 73090 X-RAY EXAM OF FOREARM: CPT

## 2025-03-09 PROCEDURE — 70450 CT HEAD/BRAIN W/O DYE: CPT

## 2025-03-09 PROCEDURE — 73060 X-RAY EXAM OF HUMERUS: CPT

## 2025-03-09 PROCEDURE — 6370000000 HC RX 637 (ALT 250 FOR IP): Performed by: STUDENT IN AN ORGANIZED HEALTH CARE EDUCATION/TRAINING PROGRAM

## 2025-03-09 RX ORDER — ACETAMINOPHEN 500 MG
1000 TABLET ORAL
Status: COMPLETED | OUTPATIENT
Start: 2025-03-09 | End: 2025-03-09

## 2025-03-09 RX ADMIN — ACETAMINOPHEN 1000 MG: 500 TABLET, FILM COATED ORAL at 19:30

## 2025-03-09 ASSESSMENT — PAIN DESCRIPTION - LOCATION
LOCATION: HEAD
LOCATION: HEAD

## 2025-03-09 ASSESSMENT — LIFESTYLE VARIABLES
HOW MANY STANDARD DRINKS CONTAINING ALCOHOL DO YOU HAVE ON A TYPICAL DAY: PATIENT DOES NOT DRINK
HOW OFTEN DO YOU HAVE A DRINK CONTAINING ALCOHOL: MONTHLY OR LESS

## 2025-03-09 ASSESSMENT — PAIN DESCRIPTION - ORIENTATION
ORIENTATION: POSTERIOR
ORIENTATION: POSTERIOR

## 2025-03-09 ASSESSMENT — PAIN SCALES - GENERAL
PAINLEVEL_OUTOF10: 10
PAINLEVEL_OUTOF10: 3

## 2025-03-10 NOTE — ED NOTES
Discharge medications reviewed with the patient and [employer] drivers and appropriate educational materials and side effects teaching were provided;patient discharge transport back to nursing home.

## 2025-03-10 NOTE — ED PROVIDER NOTES
EMERGENCY DEPARTMENT HISTORY AND PHYSICAL EXAM      Date: 3/9/2025  Patient Name: Kathryn Cueto    History of Presenting Illness     Chief Complaint   Patient presents with    Fall     BIBEMS from [facility] rehab for an unwitnessed GLF in her room this evening. Hx of dementia and patient is at her baseline. + goose egg on back of head. Patient states that she has a headache, dizziness and right arm pain.         HPI: History From: patient, History limited by: none  Kathryn Cueto, 84 y.o. female presents to the ED with cc of head pain after a fall.  She states that she was attempting to get from her bed to her wheelchair, which caused her to fall and hit the back of her head.  She is unsure if she lost consciousness.  She reports pain to the back of her head and pain in her right shoulder.  She otherwise denies complaints.  No weakness numbness or tingling in the arms or legs, no vomiting, she denies chest pain, shortness of breath or dizziness prior to the fall.  She does not take any blood thinners.  She is at neurologic baseline per EMS, has a history of dementia.          There are no other complaints, changes, or physical findings at this time.    PCP: No primary care provider on file.    No current facility-administered medications on file prior to encounter.     Current Outpatient Medications on File Prior to Encounter   Medication Sig Dispense Refill    acetaminophen (TYLENOL) 500 MG tablet Take 1,000 mg by mouth 3 times daily      amLODIPine (NORVASC) 5 MG tablet Take 5 mg by mouth daily      bumetanide (BUMEX) 1 MG tablet Take 1 mg by mouth 2 times daily      losartan (COZAAR) 100 MG tablet Take 100 mg by mouth daily      ondansetron (ZOFRAN-ODT) 4 MG disintegrating tablet Take 4 mg by mouth every 8 hours as needed      pantoprazole (PROTONIX) 40 MG tablet Take 40 mg by mouth 2 times daily      potassium chloride (KLOR-CON) 10 MEQ extended release tablet Take 10 mEq by mouth daily

## (undated) DEVICE — TOWEL 4 PLY TISS 19X30 SUE WHT

## (undated) DEVICE — Z DISCONTINUED PER MEDLINE LINE GAS SAMPLING O2/CO2 LNG AD 13 FT NSL W/ TBNG FILTERLINE

## (undated) DEVICE — KENDALL RADIOLUCENT FOAM MONITORING ELECTRODE RECTANGULAR SHAPE: Brand: KENDALL

## (undated) DEVICE — SYR 3ML LL TIP 1/10ML GRAD --

## (undated) DEVICE — CONTAINER SPEC 20 ML LID NEUT BUFF FORMALIN 10 % POLYPR STS

## (undated) DEVICE — FCPS BX HOT LG 2.2MMX240CM

## (undated) DEVICE — (D)SYR 10ML 1/5ML GRAD NSAF -- PKGING CHANGE USE ITEM 338027

## (undated) DEVICE — BASIN EMESIS 500CC ROSE 250/CS 60/PLT: Brand: MEDEGEN MEDICAL PRODUCTS, LLC

## (undated) DEVICE — CATH IV AUTOGRD BC BLU 22GA 25 -- INSYTE

## (undated) DEVICE — Device

## (undated) DEVICE — NON-REM POLYHESIVE PATIENT RETURN ELECTRODE: Brand: VALLEYLAB

## (undated) DEVICE — SOLIDIFIER MEDC 1200ML -- CONVERT TO 356117

## (undated) DEVICE — Device: Brand: MEDEX

## (undated) DEVICE — SET ADMIN 16ML TBNG L100IN 2 Y INJ SITE IV PIGGY BK DISP

## (undated) DEVICE — STRAINER URIN CALC RNL MSH -- CONVERT TO ITEM 357634

## (undated) DEVICE — NEEDLE HYPO 18GA L1.5IN PNK S STL HUB POLYPR SHLD REG BVL

## (undated) DEVICE — TRAP SUC MUCOUS 70ML -- MEDICHOICE MEDLINE

## (undated) DEVICE — NEONATAL-ADULT SPO2 SENSOR: Brand: NELLCOR

## (undated) DEVICE — 1200 GUARD II KIT W/5MM TUBE W/O VAC TUBE: Brand: GUARDIAN